# Patient Record
Sex: FEMALE | Race: WHITE | Employment: OTHER | URBAN - METROPOLITAN AREA
[De-identification: names, ages, dates, MRNs, and addresses within clinical notes are randomized per-mention and may not be internally consistent; named-entity substitution may affect disease eponyms.]

---

## 2022-09-06 ENCOUNTER — OFFICE VISIT (OUTPATIENT)
Dept: FAMILY MEDICINE CLINIC | Age: 87
End: 2022-09-06
Payer: MEDICARE

## 2022-09-06 VITALS
TEMPERATURE: 97.5 F | HEART RATE: 78 BPM | SYSTOLIC BLOOD PRESSURE: 122 MMHG | BODY MASS INDEX: 16.5 KG/M2 | HEIGHT: 65 IN | WEIGHT: 99 LBS | DIASTOLIC BLOOD PRESSURE: 62 MMHG | OXYGEN SATURATION: 98 %

## 2022-09-06 DIAGNOSIS — Z13.220 LIPID SCREENING: ICD-10-CM

## 2022-09-06 DIAGNOSIS — D64.9 ANEMIA, UNSPECIFIED TYPE: ICD-10-CM

## 2022-09-06 DIAGNOSIS — Z13.1 ENCOUNTER FOR SCREENING EXAMINATION FOR IMPAIRED GLUCOSE REGULATION AND DIABETES MELLITUS: ICD-10-CM

## 2022-09-06 DIAGNOSIS — D64.9 ANEMIA, UNSPECIFIED TYPE: Primary | ICD-10-CM

## 2022-09-06 DIAGNOSIS — Z23 NEED FOR VACCINATION AGAINST STREPTOCOCCUS PNEUMONIAE USING PNEUMOCOCCAL CONJUGATE VACCINE 13: ICD-10-CM

## 2022-09-06 LAB
ALBUMIN SERPL-MCNC: 4.6 G/DL (ref 3.5–4.6)
ALP BLD-CCNC: 58 U/L (ref 40–130)
ALT SERPL-CCNC: 10 U/L (ref 0–33)
ANION GAP SERPL CALCULATED.3IONS-SCNC: 13 MEQ/L (ref 9–15)
AST SERPL-CCNC: 25 U/L (ref 0–35)
BASOPHILS ABSOLUTE: 0 K/UL (ref 0–0.2)
BASOPHILS RELATIVE PERCENT: 0.6 %
BILIRUB SERPL-MCNC: 0.6 MG/DL (ref 0.2–0.7)
BUN BLDV-MCNC: 14 MG/DL (ref 8–23)
CALCIUM SERPL-MCNC: 9.5 MG/DL (ref 8.5–9.9)
CHLORIDE BLD-SCNC: 102 MEQ/L (ref 95–107)
CHOLESTEROL, FASTING: 255 MG/DL (ref 0–199)
CO2: 23 MEQ/L (ref 20–31)
CREAT SERPL-MCNC: 0.61 MG/DL (ref 0.5–0.9)
EOSINOPHILS ABSOLUTE: 0 K/UL (ref 0–0.7)
EOSINOPHILS RELATIVE PERCENT: 0.5 %
GFR AFRICAN AMERICAN: >60
GFR NON-AFRICAN AMERICAN: >60
GLOBULIN: 3.6 G/DL (ref 2.3–3.5)
GLUCOSE BLD-MCNC: 101 MG/DL (ref 70–99)
HCT VFR BLD CALC: 41.5 % (ref 37–47)
HDLC SERPL-MCNC: 72 MG/DL (ref 40–59)
HEMOGLOBIN: 13.7 G/DL (ref 12–16)
LDL CHOLESTEROL CALCULATED: 161 MG/DL (ref 0–129)
LYMPHOCYTES ABSOLUTE: 1.1 K/UL (ref 1–4.8)
LYMPHOCYTES RELATIVE PERCENT: 15.9 %
MCH RBC QN AUTO: 29 PG (ref 27–31.3)
MCHC RBC AUTO-ENTMCNC: 33 % (ref 33–37)
MCV RBC AUTO: 88 FL (ref 82–100)
MONOCYTES ABSOLUTE: 0.6 K/UL (ref 0.2–0.8)
MONOCYTES RELATIVE PERCENT: 8.2 %
NEUTROPHILS ABSOLUTE: 5.2 K/UL (ref 1.4–6.5)
NEUTROPHILS RELATIVE PERCENT: 74.8 %
PDW BLD-RTO: 14.1 % (ref 11.5–14.5)
PLATELET # BLD: 239 K/UL (ref 130–400)
POTASSIUM SERPL-SCNC: 4.1 MEQ/L (ref 3.4–4.9)
RBC # BLD: 4.71 M/UL (ref 4.2–5.4)
SODIUM BLD-SCNC: 138 MEQ/L (ref 135–144)
TOTAL PROTEIN: 8.2 G/DL (ref 6.3–8)
TRIGLYCERIDE, FASTING: 109 MG/DL (ref 0–150)
WBC # BLD: 6.9 K/UL (ref 4.8–10.8)

## 2022-09-06 PROCEDURE — G8427 DOCREV CUR MEDS BY ELIG CLIN: HCPCS | Performed by: PHYSICIAN ASSISTANT

## 2022-09-06 PROCEDURE — 1090F PRES/ABSN URINE INCON ASSESS: CPT | Performed by: PHYSICIAN ASSISTANT

## 2022-09-06 PROCEDURE — G8419 CALC BMI OUT NRM PARAM NOF/U: HCPCS | Performed by: PHYSICIAN ASSISTANT

## 2022-09-06 PROCEDURE — 1123F ACP DISCUSS/DSCN MKR DOCD: CPT | Performed by: PHYSICIAN ASSISTANT

## 2022-09-06 PROCEDURE — 1036F TOBACCO NON-USER: CPT | Performed by: PHYSICIAN ASSISTANT

## 2022-09-06 PROCEDURE — 99203 OFFICE O/P NEW LOW 30 MIN: CPT | Performed by: PHYSICIAN ASSISTANT

## 2022-09-06 RX ORDER — CHLORAL HYDRATE 500 MG
1000 CAPSULE ORAL DAILY
COMMUNITY

## 2022-09-06 SDOH — ECONOMIC STABILITY: FOOD INSECURITY: WITHIN THE PAST 12 MONTHS, YOU WORRIED THAT YOUR FOOD WOULD RUN OUT BEFORE YOU GOT MONEY TO BUY MORE.: NEVER TRUE

## 2022-09-06 SDOH — ECONOMIC STABILITY: FOOD INSECURITY: WITHIN THE PAST 12 MONTHS, THE FOOD YOU BOUGHT JUST DIDN'T LAST AND YOU DIDN'T HAVE MONEY TO GET MORE.: NEVER TRUE

## 2022-09-06 ASSESSMENT — PATIENT HEALTH QUESTIONNAIRE - PHQ9
SUM OF ALL RESPONSES TO PHQ QUESTIONS 1-9: 0
SUM OF ALL RESPONSES TO PHQ9 QUESTIONS 1 & 2: 0
2. FEELING DOWN, DEPRESSED OR HOPELESS: 0
1. LITTLE INTEREST OR PLEASURE IN DOING THINGS: 0
SUM OF ALL RESPONSES TO PHQ QUESTIONS 1-9: 0

## 2022-09-06 ASSESSMENT — SOCIAL DETERMINANTS OF HEALTH (SDOH): HOW HARD IS IT FOR YOU TO PAY FOR THE VERY BASICS LIKE FOOD, HOUSING, MEDICAL CARE, AND HEATING?: NOT HARD AT ALL

## 2022-09-06 NOTE — PROGRESS NOTES
Subjective  Joslyn Bernal, 81 y.o. female presents today with:  Chief Complaint   Patient presents with    New Patient    Blood Work     Wants to check hemoglobin       HPI  Patient is here accompanied by her  to establish care previously treated in [place] moved here 2 years ago  History significant for iron deficiency anemia (has had multiple transfusions to maintain levels followed by heme-onc in [place]  States she has had multiple EGDs and colonoscopies with no source of bleeding she does have a large hiatal hernia  Other history significant for hyperlipidemia and macular eye disease    Review of Systems   All other systems reviewed and are negative.       Past Medical History:   Diagnosis Date    Iron deficiency anemia      Past Surgical History:   Procedure Laterality Date    COLONOSCOPY      ESOPHAGOGASTRODUODENOSCOPY      EYE SURGERY  1999    holes in the macula    ELVIRA AND BSO (CERVIX REMOVED)      TOTAL HIP ARTHROPLASTY Bilateral 1985 jan and october     Social History     Socioeconomic History    Marital status:      Spouse name: Not on file    Number of children: Not on file    Years of education: Not on file    Highest education level: Not on file   Occupational History    Not on file   Tobacco Use    Smoking status: Never    Smokeless tobacco: Never   Substance and Sexual Activity    Alcohol use: Not on file    Drug use: Never    Sexual activity: Not on file   Other Topics Concern    Not on file   Social History Narrative    Not on file     Social Determinants of Health     Financial Resource Strain: Low Risk     Difficulty of Paying Living Expenses: Not hard at all   Food Insecurity: No Food Insecurity    Worried About Running Out of Food in the Last Year: Never true    Ran Out of Food in the Last Year: Never true   Transportation Needs: Not on file   Physical Activity: Not on file   Stress: Not on file   Social Connections: Not on file   Intimate Partner Violence: Not on file   Housing Stability: Not on file     No family history on file. Allergies   Allergen Reactions    Aspirin Hives    Levaquin [Levofloxacin]     Penicillins      Current Outpatient Medications   Medication Sig Dispense Refill    Omega-3 1000 MG CAPS Take 1,000 mg by mouth daily      B Complex Vitamins (B COMPLEX 1 PO) Take by mouth      Calcium Citrate (CITRACAL PO) Take by mouth      Bioflavonoid Products (TESSA-C/BIOFLAVONOIDS PO) Take by mouth      Red Yeast Rice Extract (RED YEAST RICE PO) Take by mouth       No current facility-administered medications for this visit. Objective    Vitals:    09/06/22 0941   BP: 122/62   Pulse: 78   Temp: 97.5 °F (36.4 °C)   TempSrc: Temporal   SpO2: 98%   Weight: 99 lb (44.9 kg)   Height: 5' 5\" (1.651 m)     Physical Exam  Constitutional:       General: She is not in acute distress. Appearance: She is underweight. She is not ill-appearing. HENT:      Head: Normocephalic and atraumatic. Eyes:      Extraocular Movements: Extraocular movements intact. Conjunctiva/sclera: Conjunctivae normal.      Pupils: Pupils are equal, round, and reactive to light. Neck:      Thyroid: No thyromegaly. Cardiovascular:      Rate and Rhythm: Normal rate and regular rhythm. Heart sounds: Normal heart sounds. No murmur heard. Pulmonary:      Effort: Pulmonary effort is normal. No respiratory distress. Breath sounds: Normal breath sounds. No wheezing, rhonchi or rales. Abdominal:      General: Bowel sounds are normal.      Palpations: Abdomen is soft. There is no mass. Tenderness: There is no abdominal tenderness. There is no guarding. Musculoskeletal:         General: Deformity present. Cervical back: Normal range of motion and neck supple. Right lower leg: No edema. Left lower leg: No edema. Lymphadenopathy:      Cervical: No cervical adenopathy. Skin:     General: Skin is warm and dry. Coloration: Skin is not jaundiced or pale. 9/6/2023).     Adina Cannon PA-C

## 2022-09-07 DIAGNOSIS — D64.9 ANEMIA, UNSPECIFIED TYPE: Primary | ICD-10-CM

## 2022-09-07 LAB
FOLATE: 12.9 NG/ML
IRON SATURATION: 15 % (ref 20–55)
IRON: 64 UG/DL (ref 37–145)
TOTAL IRON BINDING CAPACITY: 423 UG/DL (ref 250–450)
UNSATURATED IRON BINDING CAPACITY: 359 UG/DL (ref 112–347)
VITAMIN B-12: 559 PG/ML (ref 232–1245)

## 2022-09-29 ENCOUNTER — TELEPHONE (OUTPATIENT)
Dept: FAMILY MEDICINE CLINIC | Age: 87
End: 2022-09-29

## 2022-09-29 NOTE — TELEPHONE ENCOUNTER
Called patient concerning AWV and she has declined. Reports she is no longer seeing [facility] [place] [facility] for care.

## 2024-04-02 ENCOUNTER — APPOINTMENT (OUTPATIENT)
Dept: GENERAL RADIOLOGY | Age: 89
End: 2024-04-02
Payer: MEDICARE

## 2024-04-02 ENCOUNTER — HOSPITAL ENCOUNTER (EMERGENCY)
Age: 89
Discharge: HOME | End: 2024-04-02
Payer: MEDICARE

## 2024-04-02 VITALS
BODY MASS INDEX: 13.83 KG/M2 | OXYGEN SATURATION: 94 % | WEIGHT: 81 LBS | HEART RATE: 89 BPM | HEIGHT: 64 IN | SYSTOLIC BLOOD PRESSURE: 116 MMHG | DIASTOLIC BLOOD PRESSURE: 68 MMHG | RESPIRATION RATE: 18 BRPM | TEMPERATURE: 97.8 F

## 2024-04-02 DIAGNOSIS — S32.501A CLOSED NONDISPLACED FRACTURE OF RIGHT PUBIS, INITIAL ENCOUNTER (HCC): Primary | ICD-10-CM

## 2024-04-02 PROCEDURE — 99285 EMERGENCY DEPT VISIT HI MDM: CPT

## 2024-04-02 PROCEDURE — 73502 X-RAY EXAM HIP UNI 2-3 VIEWS: CPT

## 2024-04-02 RX ORDER — TRAMADOL HYDROCHLORIDE 50 MG/1
50 TABLET ORAL EVERY 8 HOURS PRN
Qty: 9 TABLET | Refills: 0 | Status: SHIPPED | OUTPATIENT
Start: 2024-04-02 | End: 2024-04-05

## 2024-04-02 ASSESSMENT — LIFESTYLE VARIABLES
HOW OFTEN DO YOU HAVE A DRINK CONTAINING ALCOHOL: NEVER
HOW MANY STANDARD DRINKS CONTAINING ALCOHOL DO YOU HAVE ON A TYPICAL DAY: PATIENT DOES NOT DRINK

## 2024-04-02 ASSESSMENT — PAIN DESCRIPTION - DESCRIPTORS: DESCRIPTORS: DISCOMFORT

## 2024-04-02 ASSESSMENT — PAIN SCALES - GENERAL: PAINLEVEL_OUTOF10: 4

## 2024-04-02 ASSESSMENT — PAIN DESCRIPTION - LOCATION: LOCATION: HIP

## 2024-04-02 ASSESSMENT — PAIN - FUNCTIONAL ASSESSMENT: PAIN_FUNCTIONAL_ASSESSMENT: 0-10

## 2024-04-02 ASSESSMENT — PAIN DESCRIPTION - ORIENTATION: ORIENTATION: RIGHT

## 2024-04-02 NOTE — ED PROVIDER NOTES
[facility] ED  EMERGENCY DEPARTMENT ENCOUNTER      Pt Name: Debbie Us  MRN: [id number]  Birthdate 1935  Date of evaluation: 4/2/2024  Provider: MANSOOR Germain  12:00 PM EDT    CHIEF COMPLAINT       Chief Complaint   Patient presents with    Fall     States she fell 1 hour ago trying to get over a puddle, pt co right hip pain, denies hitting her head - loc, - blood thinners.          HISTORY OF PRESENT ILLNESS   (Location/Symptom, Timing/Onset, Context/Setting, Quality, Duration, Modifying Factors, Severity)  Note limiting factors.   Debbie Us is a 88 y.o. female whom per chart review has a PMHx of iron deficiency anemia, hyperlipidemia, osteopenia, vitamin D deficiency, s/p bilateral total hip replacement presents to ED for evaluation following fall.  Patient reports that she was attempting to get over a puddle and patient reports that she fell onto her R hip.  Patient reports pain to her R hip since, however reports that she has been able to ambulate without difficulty.  Patient denies head injury, LOC, anticoagulation.  Patient reports that she was evaluated at [facility] ([facility]) prior to coming to the emergency department and patient reports that she did have a wound to her R forearm that was previously cleansed and had a dressing placed and reports that she has a follow-up in 2 days for wound reevaluation.  Patient is declining for wound to be reevaluated.  States that she would just like an x-ray of her R hip.  Patient verbalizes no additional complaints on arrival to the emergency department.    HPI    Nursing Notes were reviewed.    REVIEW OF SYSTEMS    (2-9 systems for level 4, 10 or more for level 5)     Review of Systems   Musculoskeletal:  Positive for arthralgias.        R hip   Skin:  Positive for wound (R forearm; patient does have a dressing in place and is requesting that dressing be left in place and wound not be reevaluated).   All other systems reviewed and are

## 2024-04-02 NOTE — DISCHARGE INSTRUCTIONS
Take medications as directed.     Wear bear as tolerated.     Follow-up with PCP, Ortho.     Return to ED if any new, or worsening symptoms.

## 2024-04-11 ENCOUNTER — OFFICE VISIT (OUTPATIENT)
Dept: ORTHOPEDIC SURGERY | Age: 89
End: 2024-04-11
Payer: MEDICARE

## 2024-04-11 ENCOUNTER — HOSPITAL ENCOUNTER (OUTPATIENT)
Dept: ORTHOPEDIC SURGERY | Age: 89
Discharge: HOME | End: 2024-04-13
Payer: MEDICARE

## 2024-04-11 VITALS — HEIGHT: 64 IN | HEART RATE: 103 BPM | OXYGEN SATURATION: 99 % | WEIGHT: 81 LBS | BODY MASS INDEX: 13.83 KG/M2

## 2024-04-11 DIAGNOSIS — M54.50 LOW BACK PAIN, UNSPECIFIED BACK PAIN LATERALITY, UNSPECIFIED CHRONICITY, UNSPECIFIED WHETHER SCIATICA PRESENT: ICD-10-CM

## 2024-04-11 DIAGNOSIS — M54.50 LOW BACK PAIN, UNSPECIFIED BACK PAIN LATERALITY, UNSPECIFIED CHRONICITY, UNSPECIFIED WHETHER SCIATICA PRESENT: Primary | ICD-10-CM

## 2024-04-11 PROCEDURE — 1036F TOBACCO NON-USER: CPT | Performed by: ORTHOPAEDIC SURGERY

## 2024-04-11 PROCEDURE — 99204 OFFICE O/P NEW MOD 45 MIN: CPT | Performed by: ORTHOPAEDIC SURGERY

## 2024-04-11 PROCEDURE — 1123F ACP DISCUSS/DSCN MKR DOCD: CPT | Performed by: ORTHOPAEDIC SURGERY

## 2024-04-11 PROCEDURE — 72110 X-RAY EXAM L-2 SPINE 4/>VWS: CPT | Performed by: ORTHOPAEDIC SURGERY

## 2024-04-11 PROCEDURE — 72110 X-RAY EXAM L-2 SPINE 4/>VWS: CPT

## 2024-04-11 PROCEDURE — 1090F PRES/ABSN URINE INCON ASSESS: CPT | Performed by: ORTHOPAEDIC SURGERY

## 2024-04-11 PROCEDURE — G8419 CALC BMI OUT NRM PARAM NOF/U: HCPCS | Performed by: ORTHOPAEDIC SURGERY

## 2024-04-11 PROCEDURE — G8427 DOCREV CUR MEDS BY ELIG CLIN: HCPCS | Performed by: ORTHOPAEDIC SURGERY

## 2024-04-11 RX ORDER — TRAMADOL HYDROCHLORIDE 50 MG/1
50 TABLET ORAL EVERY 4 HOURS PRN
Qty: 30 TABLET | Refills: 0 | Status: SHIPPED | OUTPATIENT
Start: 2024-04-11 | End: 2024-04-18

## 2024-04-11 RX ORDER — TRAMADOL HYDROCHLORIDE 50 MG/1
50 TABLET ORAL EVERY 4 HOURS PRN
Qty: 30 TABLET | Refills: 0 | Status: CANCELLED | OUTPATIENT
Start: 2024-04-11 | End: 2024-04-18

## 2024-04-11 NOTE — PROGRESS NOTES
ESOPHAGOGASTRODUODENOSCOPY      EYE SURGERY  1999    holes in the macula    ELVIRA AND BSO (CERVIX REMOVED)      TOTAL HIP ARTHROPLASTY Bilateral 1985    ana and october     --------------------------------------------------------------------------------------------------------------    Tobacco Use      Smoking status: Never      Smokeless tobacco: Never     reports no history of drug use.  --------------------------------------------------------------------------------------------------------------  Allergies   Allergen Reactions    Aspirin Hives    Levaquin [Levofloxacin]     Penicillins      --------------------------------------------------------------------------------------------------------------    Current Outpatient Medications:     Omega-3 1000 MG CAPS, Take 1 capsule by mouth daily, Disp: , Rfl:     B Complex Vitamins (B COMPLEX 1 PO), Take by mouth, Disp: , Rfl:     Calcium Citrate (CITRACAL PO), Take by mouth, Disp: , Rfl:     Bioflavonoid Products (TESSA-C/BIOFLAVONOIDS PO), Take by mouth, Disp: , Rfl:     Red Yeast Rice Extract (RED YEAST RICE PO), Take by mouth, Disp: , Rfl:   --------------------------------------------------------------------------------------------------------------    Mark Camilo DO  Orthopedic and Spine Surgeon  [facility]  [phone]

## 2024-04-13 ENCOUNTER — DOCUMENTATION (OUTPATIENT)
Dept: HOME HEALTH SERVICES | Facility: HOME HEALTH | Age: 89
End: 2024-04-13

## 2024-04-13 ENCOUNTER — OFFICE VISIT (OUTPATIENT)
Dept: PRIMARY CARE | Facility: CLINIC | Age: 89
End: 2024-04-13
Payer: MEDICARE

## 2024-04-13 ENCOUNTER — HOME HEALTH ADMISSION (OUTPATIENT)
Dept: HOME HEALTH SERVICES | Facility: HOME HEALTH | Age: 89
End: 2024-04-13
Payer: MEDICARE

## 2024-04-13 VITALS
BODY MASS INDEX: 14 KG/M2 | RESPIRATION RATE: 16 BRPM | SYSTOLIC BLOOD PRESSURE: 111 MMHG | DIASTOLIC BLOOD PRESSURE: 67 MMHG | WEIGHT: 82 LBS | TEMPERATURE: 97.5 F | OXYGEN SATURATION: 97 % | HEART RATE: 85 BPM | HEIGHT: 64 IN

## 2024-04-13 DIAGNOSIS — F03.B0 MODERATE DEMENTIA WITHOUT BEHAVIORAL DISTURBANCE, PSYCHOTIC DISTURBANCE, MOOD DISTURBANCE, OR ANXIETY, UNSPECIFIED DEMENTIA TYPE (MULTI): ICD-10-CM

## 2024-04-13 DIAGNOSIS — M81.0 AGE RELATED OSTEOPOROSIS, UNSPECIFIED PATHOLOGICAL FRACTURE PRESENCE: ICD-10-CM

## 2024-04-13 DIAGNOSIS — Z00.00 ROUTINE GENERAL MEDICAL EXAMINATION AT HEALTH CARE FACILITY: Primary | ICD-10-CM

## 2024-04-13 DIAGNOSIS — R53.83 FATIGUE, UNSPECIFIED TYPE: ICD-10-CM

## 2024-04-13 DIAGNOSIS — E78.2 HYPERLIPIDEMIA, MIXED: ICD-10-CM

## 2024-04-13 DIAGNOSIS — Z78.9 DECREASED ACTIVITIES OF DAILY LIVING (ADL): ICD-10-CM

## 2024-04-13 DIAGNOSIS — R26.9 ABNORMALITY OF GAIT: ICD-10-CM

## 2024-04-13 DIAGNOSIS — E55.9 VITAMIN D DEFICIENCY: ICD-10-CM

## 2024-04-13 DIAGNOSIS — Z78.0 POSTMENOPAUSAL: ICD-10-CM
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
## 2024-04-13 DIAGNOSIS — Z71.89 ADVANCED DIRECTIVES, COUNSELING/DISCUSSION: ICD-10-CM

## 2024-04-13 PROBLEM — M85.80 OSTEOPENIA, SENILE: Status: ACTIVE | Noted: 2021-01-19

## 2024-04-13 PROCEDURE — 1036F TOBACCO NON-USER: CPT | Performed by: FAMILY MEDICINE

## 2024-04-13 PROCEDURE — 1123F ACP DISCUSS/DSCN MKR DOCD: CPT | Performed by: FAMILY MEDICINE

## 2024-04-13 PROCEDURE — 99214 OFFICE O/P EST MOD 30 MIN: CPT | Performed by: FAMILY MEDICINE

## 2024-04-13 PROCEDURE — 1160F RVW MEDS BY RX/DR IN RCRD: CPT | Performed by: FAMILY MEDICINE

## 2024-04-13 PROCEDURE — G0439 PPPS, SUBSEQ VISIT: HCPCS | Performed by: FAMILY MEDICINE

## 2024-04-13 PROCEDURE — 99497 ADVNCD CARE PLAN 30 MIN: CPT | Performed by: FAMILY MEDICINE

## 2024-04-13 PROCEDURE — 1159F MED LIST DOCD IN RCRD: CPT | Performed by: FAMILY MEDICINE

## 2024-04-13 PROCEDURE — 1158F ADVNC CARE PLAN TLK DOCD: CPT | Performed by: FAMILY MEDICINE

## 2024-04-13 PROCEDURE — 1170F FXNL STATUS ASSESSED: CPT | Performed by: FAMILY MEDICINE

## 2024-04-13 RX ORDER — TRAMADOL HYDROCHLORIDE 50 MG/1
50 TABLET ORAL
COMMUNITY
Start: 2024-04-11 | End: 2024-04-13 | Stop reason: ALTCHOICE

## 2024-04-13 ASSESSMENT — ENCOUNTER SYMPTOMS
NECK PAIN: 0
FATIGUE: 1
DEPRESSION: 0
VOMITING: 0
SORE THROAT: 0
VOICE CHANGE: 0
PALPITATIONS: 0
FREQUENCY: 0
BLOOD IN STOOL: 0
JOINT SWELLING: 0
LIGHT-HEADEDNESS: 0
ACTIVITY CHANGE: 0
EYE REDNESS: 0
WHEEZING: 0
POLYDIPSIA: 0
ARTHRALGIAS: 0
SPEECH DIFFICULTY: 0
NAUSEA: 0
ADENOPATHY: 0
DIARRHEA: 0
CONSTIPATION: 0
PHOTOPHOBIA: 0
LOSS OF SENSATION IN FEET: 0
AGITATION: 0
OCCASIONAL FEELINGS OF UNSTEADINESS: 0
HEADACHES: 0
DIAPHORESIS: 0
EYE ITCHING: 0
WOUND: 0
TROUBLE SWALLOWING: 0
DYSPHORIC MOOD: 0
DYSURIA: 0
SINUS PRESSURE: 0
CHEST TIGHTNESS: 0
CONFUSION: 0
APPETITE CHANGE: 0
ABDOMINAL DISTENTION: 0
NERVOUS/ANXIOUS: 0
CHILLS: 0
DIZZINESS: 0
TREMORS: 0
SLEEP DISTURBANCE: 0
SHORTNESS OF BREATH: 0
NECK STIFFNESS: 0
NUMBNESS: 0
BACK PAIN: 0
MYALGIAS: 0
ABDOMINAL PAIN: 0
BRUISES/BLEEDS EASILY: 0
HEMATURIA: 0
HALLUCINATIONS: 0
FEVER: 0
EYE DISCHARGE: 0
CHOKING: 0
EYE PAIN: 0
FACIAL ASYMMETRY: 0
SEIZURES: 0
COUGH: 0
FLANK PAIN: 0
UNEXPECTED WEIGHT CHANGE: 0
WEAKNESS: 1
RHINORRHEA: 0

## 2024-04-13 ASSESSMENT — PATIENT HEALTH QUESTIONNAIRE - PHQ9
6. FEELING BAD ABOUT YOURSELF - OR THAT YOU ARE A FAILURE OR HAVE LET YOURSELF OR YOUR FAMILY DOWN: NOT AT ALL
SUM OF ALL RESPONSES TO PHQ9 QUESTIONS 1 AND 2: 0
4. FEELING TIRED OR HAVING LITTLE ENERGY: NEARLY EVERY DAY
9. THOUGHTS THAT YOU WOULD BE BETTER OFF DEAD, OR OF HURTING YOURSELF: NOT AT ALL
3. TROUBLE FALLING OR STAYING ASLEEP OR SLEEPING TOO MUCH: NOT AT ALL
1. LITTLE INTEREST OR PLEASURE IN DOING THINGS: NOT AT ALL
10. IF YOU CHECKED OFF ANY PROBLEMS, HOW DIFFICULT HAVE THESE PROBLEMS MADE IT FOR YOU TO DO YOUR WORK, TAKE CARE OF THINGS AT HOME, OR GET ALONG WITH OTHER PEOPLE: SOMEWHAT DIFFICULT
2. FEELING DOWN, DEPRESSED OR HOPELESS: NOT AT ALL
2. FEELING DOWN, DEPRESSED OR HOPELESS: NEARLY EVERY DAY
7. TROUBLE CONCENTRATING ON THINGS, SUCH AS READING THE NEWSPAPER OR WATCHING TELEVISION: NOT AT ALL
8. MOVING OR SPEAKING SO SLOWLY THAT OTHER PEOPLE COULD HAVE NOTICED. OR THE OPPOSITE, BEING SO FIGETY OR RESTLESS THAT YOU HAVE BEEN MOVING AROUND A LOT MORE THAN USUAL: SEVERAL DAYS
SUM OF ALL RESPONSES TO PHQ9 QUESTIONS 1 AND 2: 3
1. LITTLE INTEREST OR PLEASURE IN DOING THINGS: NOT AT ALL

## 2024-04-13 ASSESSMENT — ACTIVITIES OF DAILY LIVING (ADL)
DOING_HOUSEWORK: TOTAL CARE
DRESSING: NEEDS ASSISTANCE
TAKING_MEDICATION: TOTAL CARE
BATHING: NEEDS ASSISTANCE
GROCERY_SHOPPING: TOTAL CARE
MANAGING_FINANCES: TOTAL CARE

## 2024-04-13 NOTE — PROGRESS NOTES
Subjective   Patient ID: Esther Ceja is a 88 y.o. female who presents for Face-to-Face (For Home Health Aid and PT), ER Follow-up ([facility] ER 4/2/2024/DX: Fall), and Medicare Annual Wellness Visit Subsequent.    Comes into the office today for follow-up after falling.  Was seen in the emergency department x-rays were done pelvis and lumbar spine.  Fractures of uncertain age noted in lumbar spine and pelvis.  Patient not complaining of any pain at the sites so currently thought to be older fractures.   brings her in because she has been having increasing difficulty with ambulating increasing difficulty with bathing and maintaining activities of daily living.    Memory Loss    Patient reports onset of memory loss was more than 1 year ago. Onset quality is gradual.     Symptoms associated with memory loss include changes in short-term memory, changes in long-term memory and repetitive questions.     Patient does not have the following behavorial problems associated with memory loss: paranoia, suspiciousness, hallucinations, delusions or agitation.    The patient is not taking medications.     Patient lives with spouse or partner. Patient lives in a/an assisted living.  Hyperlipidemia  This is a chronic problem. The current episode started more than 1 year ago. Pertinent negatives include no chest pain, myalgias or shortness of breath. She is currently on no antihyperlipidemic treatment. Compliance problems include psychosocial issues.  Risk factors for coronary artery disease include dyslipidemia and post-menopausal.   Fatigue  This is a chronic problem. The current episode started more than 1 year ago. The problem occurs constantly. The problem has been gradually worsening. Associated symptoms include fatigue and weakness. Pertinent negatives include no abdominal pain, arthralgias, chest pain, chills, congestion, coughing, diaphoresis, fever, headaches, joint swelling, myalgias, nausea, neck pain, numbness, rash,  "sore throat or vomiting. Nothing aggravates the symptoms. She has tried rest for the symptoms. The treatment provided no relief.        Review of Systems   Constitutional:  Positive for fatigue. Negative for activity change, appetite change, chills, diaphoresis, fever and unexpected weight change.   HENT:  Negative for congestion, ear pain, hearing loss, nosebleeds, postnasal drip, rhinorrhea, sinus pressure, sneezing, sore throat, tinnitus, trouble swallowing and voice change.    Eyes:  Negative for photophobia, pain, discharge, redness, itching and visual disturbance.   Respiratory:  Negative for cough, choking, chest tightness, shortness of breath and wheezing.    Cardiovascular:  Negative for chest pain, palpitations and leg swelling.   Gastrointestinal:  Negative for abdominal distention, abdominal pain, blood in stool, constipation, diarrhea, nausea and vomiting.   Endocrine: Negative for cold intolerance, heat intolerance, polydipsia and polyuria.   Genitourinary:  Negative for dysuria, flank pain, frequency, hematuria and urgency.   Musculoskeletal:  Negative for arthralgias, back pain, joint swelling, myalgias, neck pain and neck stiffness.   Skin:  Negative for rash and wound.   Allergic/Immunologic: Negative for immunocompromised state.   Neurological:  Positive for weakness. Negative for dizziness, tremors, seizures, syncope, facial asymmetry, speech difficulty, light-headedness, numbness and headaches.   Hematological:  Negative for adenopathy. Does not bruise/bleed easily.   Psychiatric/Behavioral:  Negative for agitation, behavioral problems, confusion, dysphoric mood, hallucinations, self-injury, sleep disturbance and suicidal ideas. The patient is not nervous/anxious.        Objective   /67 (BP Location: Left arm, Patient Position: Sitting, BP Cuff Size: Adult)   Pulse 85   Temp 36.4 °C (97.5 °F) (Temporal)   Resp 16   Ht 1.613 m (5' 3.5\")   Wt (!) 37.2 kg (82 lb)   SpO2 97%   BMI 14.30 " kg/m²     Physical Exam  Constitutional:       General: She is not in acute distress.     Appearance: She is not ill-appearing or diaphoretic.   HENT:      Head: Normocephalic and atraumatic.      Right Ear: External ear normal.      Left Ear: External ear normal.      Nose: Nose normal. No rhinorrhea.   Eyes:      General: Lids are normal. No scleral icterus.        Right eye: No discharge.         Left eye: No discharge.      Conjunctiva/sclera: Conjunctivae normal.   Cardiovascular:      Rate and Rhythm: Normal rate and regular rhythm.      Pulses: Normal pulses.      Heart sounds: No murmur heard.  Pulmonary:      Effort: Pulmonary effort is normal. No respiratory distress.      Breath sounds: No decreased breath sounds, wheezing, rhonchi or rales.   Abdominal:      General: Bowel sounds are normal. There is no distension.      Palpations: Abdomen is soft. There is no mass.      Tenderness: There is no abdominal tenderness. There is no guarding or rebound.   Musculoskeletal:         General: No swelling, tenderness or deformity.      Cervical back: No rigidity or tenderness.      Right lower leg: No edema.      Left lower leg: No edema.   Lymphadenopathy:      Cervical: No cervical adenopathy.      Upper Body:      Right upper body: No supraclavicular adenopathy.      Left upper body: No supraclavicular adenopathy.   Skin:     General: Skin is warm and dry.      Coloration: Skin is not jaundiced or pale.      Findings: No erythema, lesion or rash.   Neurological:      General: No focal deficit present.      Mental Status: She is alert and oriented to person, place, and time.      Sensory: No sensory deficit.      Motor: No weakness or tremor.      Coordination: Coordination normal.      Gait: Gait normal.      Comments: Slums 9/30   Psychiatric:         Mood and Affect: Mood normal. Affect is not inappropriate.         Behavior: Behavior normal.         Assessment/Plan   Diagnoses and all orders for this  visit:  Routine general medical examination at health care facility  Postmenopausal  -     XR DEXA bone density; Future  Advanced directives, counseling/discussion  -     Full code  Fall, subsequent encounter  -     Referral to Home Care; Future  Moderate dementia without behavioral disturbance, psychotic disturbance, mood disturbance, or anxiety, unspecified dementia type (Multi)  -     Referral to Home Care; Future  -     Comprehensive Metabolic Panel; Future  -     CBC and Auto Differential; Future  -     Lipid Panel; Future  -     Magnesium; Future  -     TSH with reflex to Free T4 if abnormal; Future  -     Vitamin B12; Future  -     Folate; Future  -     Vitamin D 25-Hydroxy,Total (for eval of Vitamin D levels); Future  -     Vitamin B6; Future  -     Syphilis Screen with Reflex; Future  -     Follow Up In [facility] - PCP - Established; Future  Decreased activities of daily living (ADL)  -     Referral to Home Care; Future  Abnormality of gait  -     Referral to Home Care; Future  Age related osteoporosis, unspecified pathological fracture presence  -     Comprehensive Metabolic Panel; Future  -     Magnesium; Future  -     Vitamin D 25-Hydroxy,Total (for eval of Vitamin D levels); Future  Fatigue, unspecified type  -     TSH with reflex to Free T4 if abnormal; Future  -     Vitamin B12; Future  -     Folate; Future  -     Vitamin D 25-Hydroxy,Total (for eval of Vitamin D levels); Future  -     Vitamin B6; Future  -     Follow Up In [facility] - PCP - Established; Future  Hyperlipidemia, mixed  -     Comprehensive Metabolic Panel; Future  -     Lipid Panel; Future  -     TSH with reflex to Free T4 if abnormal; Future  -     Follow Up In [facility] - PCP - Established; Future  Vitamin D deficiency  -     Vitamin D 25-Hydroxy,Total (for eval of Vitamin D levels); Future  -     Follow Up In [facility] - PCP - Established; Future    Advance care planning was discussed  including living will, power of  for healthcare and living will.  Advance care planning packet will be provided to patient at discharge.  Patient was encouraged to bring in any advanced care planning paperwork to file on the chart at their own convenience.  (~16min spent discussing above)

## 2024-04-13 NOTE — HH CARE COORDINATION
Home Care received a Referral for Nursing, Physical Therapy, Occupational Therapy, and Home Health Aide. We have processed the referral for a Start of Care on 04/15/2024.     If you have any questions or concerns, please feel free to contact us at [phone]. Follow the prompts, enter your five digit zip code, and you will be directed to your care team on [facility] 1.

## 2024-04-16 ENCOUNTER — TRANSCRIBE ORDERS (OUTPATIENT)
Dept: ADMINISTRATIVE | Age: 89
End: 2024-04-16

## 2024-04-16 DIAGNOSIS — Z78.0 POST-MENOPAUSAL: Primary | ICD-10-CM

## 2024-04-17 ENCOUNTER — HOME CARE VISIT (OUTPATIENT)
Dept: HOME HEALTH SERVICES | Facility: HOME HEALTH | Age: 89
End: 2024-04-17
Payer: MEDICARE

## 2024-04-17 VITALS
TEMPERATURE: 97.8 F | OXYGEN SATURATION: 97 % | RESPIRATION RATE: 18 BRPM | DIASTOLIC BLOOD PRESSURE: 60 MMHG | SYSTOLIC BLOOD PRESSURE: 102 MMHG | HEART RATE: 80 BPM

## 2024-04-17 PROCEDURE — 0023 HH SOC

## 2024-04-17 PROCEDURE — 169592 NO-PAY CLAIM PROCEDURE

## 2024-04-17 PROCEDURE — 1090000002 HH PPS REVENUE DEBIT

## 2024-04-17 PROCEDURE — 1090000001 HH PPS REVENUE CREDIT

## 2024-04-17 PROCEDURE — G0299 HHS/HOSPICE OF RN EA 15 MIN: HCPCS | Mod: HHH

## 2024-04-18 ENCOUNTER — HOME CARE VISIT (OUTPATIENT)
Dept: HOME HEALTH SERVICES | Facility: HOME HEALTH | Age: 89
End: 2024-04-18
Payer: MEDICARE

## 2024-04-18 PROCEDURE — G0152 HHCP-SERV OF OT,EA 15 MIN: HCPCS | Mod: HHH

## 2024-04-18 PROCEDURE — 1090000002 HH PPS REVENUE DEBIT

## 2024-04-18 PROCEDURE — 1090000001 HH PPS REVENUE CREDIT

## 2024-04-18 ASSESSMENT — ENCOUNTER SYMPTOMS
LOWEST PAIN SEVERITY IN PAST 24 HOURS: 3/10
CONTUSION: 1
SUBJECTIVE PAIN PROGRESSION: GRADUALLY IMPROVING
PAIN LOCATION - PAIN QUALITY: STABBING
DESCRIPTION OF MEMORY LOSS: LONG TERM
PAIN LOCATION - PAIN SEVERITY: 5/10
HIGHEST PAIN SEVERITY IN PAST 24 HOURS: 8/10
DENIES PAIN: 1
PAIN LOCATION - PAIN FREQUENCY: WITH ACTIVITY
PAIN: 1
PAIN LOCATION - RELIEVING FACTORS: LYING DOWN
PERSON REPORTING PAIN: PATIENT
APPETITE LEVEL: FAIR
FORGETFULNESS: 1
MUSCLE WEAKNESS: 1
DESCRIPTION OF MEMORY LOSS: SHORT TERM
PAIN LOCATION - EXACERBATING FACTORS: GETTING OUT OF BED
PAIN LOCATION: COCCYX
CHANGE IN APPETITE: UNCHANGED

## 2024-04-18 ASSESSMENT — ACTIVITIES OF DAILY LIVING (ADL)
PHYSICAL TRANSFERS ASSESSED: 1
DRESSING_UB_CURRENT_FUNCTION: INDEPENDENT
AMBULATION ASSISTANCE: 1
PHYSICAL_TRANSFERS_DEVICES: WW
CURRENT_FUNCTION: CONTACT GUARD ASSIST
OASIS_M1830: 03
AMBULATION ASSISTANCE: CONTACT GUARD ASSIST
DRESSING_LB_CURRENT_FUNCTION: MINIMUM ASSIST
TOILETING: 1
ENTERING_EXITING_HOME: NEEDS ASSISTANCE
TOILETING: STAND BY ASSIST
TOILETING EQUIPMENT USED: WW

## 2024-04-19 ENCOUNTER — OFFICE VISIT (OUTPATIENT)
Dept: ORTHOPEDIC SURGERY | Age: 89
End: 2024-04-19
Payer: MEDICARE

## 2024-04-19 VITALS
HEIGHT: 64 IN | OXYGEN SATURATION: 95 % | TEMPERATURE: 97.3 F | HEART RATE: 65 BPM | WEIGHT: 81 LBS | BODY MASS INDEX: 13.83 KG/M2

## 2024-04-19 DIAGNOSIS — M54.50 LOW BACK PAIN, UNSPECIFIED BACK PAIN LATERALITY, UNSPECIFIED CHRONICITY, UNSPECIFIED WHETHER SCIATICA PRESENT: Primary | ICD-10-CM

## 2024-04-19 PROCEDURE — G8427 DOCREV CUR MEDS BY ELIG CLIN: HCPCS | Performed by: ORTHOPAEDIC SURGERY

## 2024-04-19 PROCEDURE — G8419 CALC BMI OUT NRM PARAM NOF/U: HCPCS | Performed by: ORTHOPAEDIC SURGERY

## 2024-04-19 PROCEDURE — 99213 OFFICE O/P EST LOW 20 MIN: CPT | Performed by: ORTHOPAEDIC SURGERY

## 2024-04-19 PROCEDURE — 1090F PRES/ABSN URINE INCON ASSESS: CPT | Performed by: ORTHOPAEDIC SURGERY

## 2024-04-19 PROCEDURE — 1090000001 HH PPS REVENUE CREDIT

## 2024-04-19 PROCEDURE — 1036F TOBACCO NON-USER: CPT | Performed by: ORTHOPAEDIC SURGERY

## 2024-04-19 PROCEDURE — 1123F ACP DISCUSS/DSCN MKR DOCD: CPT | Performed by: ORTHOPAEDIC SURGERY

## 2024-04-19 PROCEDURE — 1090000002 HH PPS REVENUE DEBIT

## 2024-04-19 NOTE — PROGRESS NOTES
use.  --------------------------------------------------------------------------------------------------------------  Allergies   Allergen Reactions    Aspirin Hives    Levaquin [Levofloxacin]     Penicillins      --------------------------------------------------------------------------------------------------------------    Current Outpatient Medications:     Omega-3 1000 MG CAPS, Take 1 capsule by mouth daily, Disp: , Rfl:     B Complex Vitamins (B COMPLEX 1 PO), Take by mouth, Disp: , Rfl:     Calcium Citrate (CITRACAL PO), Take by mouth, Disp: , Rfl:     Bioflavonoid Products (TESSA-C/BIOFLAVONOIDS PO), Take by mouth, Disp: , Rfl:     Red Yeast Rice Extract (RED YEAST RICE PO), Take by mouth, Disp: , Rfl:   --------------------------------------------------------------------------------------------------------------    Mark Camilo DO  Orthopedic and Spine Surgeon  [facility]  [phone]

## 2024-04-20 PROCEDURE — 1090000001 HH PPS REVENUE CREDIT

## 2024-04-20 PROCEDURE — 1090000002 HH PPS REVENUE DEBIT

## 2024-04-21 PROCEDURE — 1090000001 HH PPS REVENUE CREDIT

## 2024-04-21 PROCEDURE — 1090000002 HH PPS REVENUE DEBIT

## 2024-04-22 ENCOUNTER — HOME CARE VISIT (OUTPATIENT)
Dept: HOME HEALTH SERVICES | Facility: HOME HEALTH | Age: 89
End: 2024-04-22
Payer: MEDICARE

## 2024-04-22 VITALS — TEMPERATURE: 97.7 F

## 2024-04-22 PROCEDURE — 1090000001 HH PPS REVENUE CREDIT

## 2024-04-22 PROCEDURE — 1090000002 HH PPS REVENUE DEBIT

## 2024-04-22 PROCEDURE — G0151 HHCP-SERV OF PT,EA 15 MIN: HCPCS | Mod: HHH

## 2024-04-22 SDOH — HEALTH STABILITY: PHYSICAL HEALTH: PHYSICAL EXERCISE: SEATED

## 2024-04-22 SDOH — HEALTH STABILITY: PHYSICAL HEALTH: EXERCISE ACTIVITY: KNEE FLEIXON

## 2024-04-22 SDOH — HEALTH STABILITY: PHYSICAL HEALTH: EXERCISE TYPE: INSTRUCTED AND DEMONSTRATED SUP INDEP SEATED THER EX PROGRAM, NEW HANDOUT PROVIDED

## 2024-04-22 SDOH — HEALTH STABILITY: PHYSICAL HEALTH: EXERCISE ACTIVITY: SIT TO STAND

## 2024-04-22 SDOH — HEALTH STABILITY: PHYSICAL HEALTH: EXERCISE ACTIVITY: HIP ABD/ADDUCTION

## 2024-04-22 SDOH — HEALTH STABILITY: PHYSICAL HEALTH: EXERCISE ACTIVITY: ANKLE PUMPS

## 2024-04-22 SDOH — HEALTH STABILITY: PHYSICAL HEALTH: EXERCISE ACTIVITY: HIP FLEXION

## 2024-04-22 SDOH — HEALTH STABILITY: PHYSICAL HEALTH: EXERCISE ACTIVITY: KNEE EXTENSION

## 2024-04-22 ASSESSMENT — ENCOUNTER SYMPTOMS
PAIN: 1
PERSON REPORTING PAIN: PATIENT
SUBJECTIVE PAIN PROGRESSION: UNCHANGED
PAIN LOCATION - PAIN FREQUENCY: FREQUENT
HIGHEST PAIN SEVERITY IN PAST 24 HOURS: 4/10
LOWEST PAIN SEVERITY IN PAST 24 HOURS: 0/10
PAIN SEVERITY GOAL: 0/10
PAIN LOCATION - PAIN SEVERITY: 2/10
PAIN LOCATION: BACK
OCCASIONAL FEELINGS OF UNSTEADINESS: 1
PAIN LOCATION - PAIN QUALITY: ACHING

## 2024-04-22 ASSESSMENT — ACTIVITIES OF DAILY LIVING (ADL): AMBULATION ASSISTANCE ON FLAT SURFACES: 1

## 2024-04-23 ENCOUNTER — HOME CARE VISIT (OUTPATIENT)
Dept: HOME HEALTH SERVICES | Facility: HOME HEALTH | Age: 89
End: 2024-04-23
Payer: MEDICARE

## 2024-04-23 PROCEDURE — 1090000002 HH PPS REVENUE DEBIT

## 2024-04-23 PROCEDURE — 1090000001 HH PPS REVENUE CREDIT

## 2024-04-23 PROCEDURE — G0152 HHCP-SERV OF OT,EA 15 MIN: HCPCS | Mod: HHH

## 2024-04-23 ASSESSMENT — ENCOUNTER SYMPTOMS
PERSON REPORTING PAIN: PATIENT
DENIES PAIN: 1

## 2024-04-24 ENCOUNTER — HOME CARE VISIT (OUTPATIENT)
Dept: HOME HEALTH SERVICES | Facility: HOME HEALTH | Age: 89
End: 2024-04-24
Payer: MEDICARE

## 2024-04-24 ENCOUNTER — LAB (OUTPATIENT)
Dept: LAB | Facility: LAB | Age: 89
End: 2024-04-24
Payer: MEDICARE

## 2024-04-24 VITALS
RESPIRATION RATE: 18 BRPM | OXYGEN SATURATION: 98 % | SYSTOLIC BLOOD PRESSURE: 100 MMHG | HEART RATE: 64 BPM | DIASTOLIC BLOOD PRESSURE: 54 MMHG

## 2024-04-24 VITALS — HEART RATE: 107 BPM

## 2024-04-24 DIAGNOSIS — E55.9 VITAMIN D DEFICIENCY: ICD-10-CM

## 2024-04-24 DIAGNOSIS — R53.83 FATIGUE, UNSPECIFIED TYPE: ICD-10-CM

## 2024-04-24 DIAGNOSIS — E78.2 HYPERLIPIDEMIA, MIXED: ICD-10-CM

## 2024-04-24 DIAGNOSIS — M81.0 AGE RELATED OSTEOPOROSIS, UNSPECIFIED PATHOLOGICAL FRACTURE PRESENCE: ICD-10-CM

## 2024-04-24 DIAGNOSIS — F03.B0 MODERATE DEMENTIA WITHOUT BEHAVIORAL DISTURBANCE, PSYCHOTIC DISTURBANCE, MOOD DISTURBANCE, OR ANXIETY, UNSPECIFIED DEMENTIA TYPE (MULTI): ICD-10-CM

## 2024-04-24 LAB
25(OH)D3 SERPL-MCNC: 23 NG/ML (ref 30–100)
ALBUMIN SERPL BCP-MCNC: 3.7 G/DL (ref 3.4–5)
ALP SERPL-CCNC: 169 U/L (ref 33–136)
ALT SERPL W P-5'-P-CCNC: 9 U/L (ref 7–45)
ANION GAP SERPL CALC-SCNC: 15 MMOL/L (ref 10–20)
AST SERPL W P-5'-P-CCNC: 20 U/L (ref 9–39)
BASOPHILS # BLD AUTO: 0.05 X10*3/UL (ref 0–0.1)
BASOPHILS NFR BLD AUTO: 0.9 %
BILIRUB SERPL-MCNC: 0.7 MG/DL (ref 0–1.2)
BUN SERPL-MCNC: 19 MG/DL (ref 6–23)
CALCIUM SERPL-MCNC: 9.1 MG/DL (ref 8.6–10.3)
CHLORIDE SERPL-SCNC: 103 MMOL/L (ref 98–107)
CHOLEST SERPL-MCNC: 212 MG/DL (ref 0–199)
CHOLESTEROL/HDL RATIO: 3.9
CO2 SERPL-SCNC: 25 MMOL/L (ref 21–32)
CREAT SERPL-MCNC: 0.55 MG/DL (ref 0.5–1.05)
EGFRCR SERPLBLD CKD-EPI 2021: 89 ML/MIN/1.73M*2
EOSINOPHIL # BLD AUTO: 0.08 X10*3/UL (ref 0–0.4)
EOSINOPHIL NFR BLD AUTO: 1.5 %
ERYTHROCYTE [DISTWIDTH] IN BLOOD BY AUTOMATED COUNT: 13.5 % (ref 11.5–14.5)
FOLATE SERPL-MCNC: 9.2 NG/ML
GLUCOSE SERPL-MCNC: 65 MG/DL (ref 74–99)
HCT VFR BLD AUTO: 41.6 % (ref 36–46)
HDLC SERPL-MCNC: 53.9 MG/DL
HGB BLD-MCNC: 12.9 G/DL (ref 12–16)
IMM GRANULOCYTES # BLD AUTO: 0.01 X10*3/UL (ref 0–0.5)
IMM GRANULOCYTES NFR BLD AUTO: 0.2 % (ref 0–0.9)
LDLC SERPL CALC-MCNC: 135 MG/DL
LYMPHOCYTES # BLD AUTO: 0.95 X10*3/UL (ref 0.8–3)
LYMPHOCYTES NFR BLD AUTO: 17.8 %
MAGNESIUM SERPL-MCNC: 2.25 MG/DL (ref 1.6–2.4)
MCH RBC QN AUTO: 29.8 PG (ref 26–34)
MCHC RBC AUTO-ENTMCNC: 31 G/DL (ref 32–36)
MCV RBC AUTO: 96 FL (ref 80–100)
MONOCYTES # BLD AUTO: 0.46 X10*3/UL (ref 0.05–0.8)
MONOCYTES NFR BLD AUTO: 8.6 %
NEUTROPHILS # BLD AUTO: 3.78 X10*3/UL (ref 1.6–5.5)
NEUTROPHILS NFR BLD AUTO: 71 %
NON HDL CHOLESTEROL: 158 MG/DL (ref 0–149)
NRBC BLD-RTO: 0 /100 WBCS (ref 0–0)
PLATELET # BLD AUTO: 431 X10*3/UL (ref 150–450)
POTASSIUM SERPL-SCNC: 4.5 MMOL/L (ref 3.5–5.3)
PROT SERPL-MCNC: 7 G/DL (ref 6.4–8.2)
RBC # BLD AUTO: 4.33 X10*6/UL (ref 4–5.2)
SODIUM SERPL-SCNC: 138 MMOL/L (ref 136–145)
TREPONEMA PALLIDUM IGG+IGM AB [PRESENCE] IN SERUM OR PLASMA BY IMMUNOASSAY: NONREACTIVE
TRIGL SERPL-MCNC: 116 MG/DL (ref 0–149)
TSH SERPL-ACNC: 2.19 MIU/L (ref 0.44–3.98)
VIT B12 SERPL-MCNC: 282 PG/ML (ref 211–911)
VLDL: 23 MG/DL (ref 0–40)
WBC # BLD AUTO: 5.3 X10*3/UL (ref 4.4–11.3)

## 2024-04-24 PROCEDURE — 80061 LIPID PANEL: CPT

## 2024-04-24 PROCEDURE — 85025 COMPLETE CBC W/AUTO DIFF WBC: CPT

## 2024-04-24 PROCEDURE — 1090000002 HH PPS REVENUE DEBIT

## 2024-04-24 PROCEDURE — 1090000001 HH PPS REVENUE CREDIT

## 2024-04-24 PROCEDURE — 80053 COMPREHEN METABOLIC PANEL: CPT

## 2024-04-24 PROCEDURE — 84207 ASSAY OF VITAMIN B-6: CPT

## 2024-04-24 PROCEDURE — 84443 ASSAY THYROID STIM HORMONE: CPT

## 2024-04-24 PROCEDURE — G0299 HHS/HOSPICE OF RN EA 15 MIN: HCPCS | Mod: HHH

## 2024-04-24 PROCEDURE — 82306 VITAMIN D 25 HYDROXY: CPT

## 2024-04-24 PROCEDURE — 83735 ASSAY OF MAGNESIUM: CPT

## 2024-04-24 PROCEDURE — G0157 HHC PT ASSISTANT EA 15: HCPCS | Mod: CQ,HHH

## 2024-04-24 PROCEDURE — 82607 VITAMIN B-12: CPT

## 2024-04-24 PROCEDURE — 82746 ASSAY OF FOLIC ACID SERUM: CPT

## 2024-04-24 PROCEDURE — 86780 TREPONEMA PALLIDUM: CPT

## 2024-04-24 PROCEDURE — 36415 COLL VENOUS BLD VENIPUNCTURE: CPT

## 2024-04-24 SDOH — HEALTH STABILITY: PHYSICAL HEALTH
EXERCISE COMMENTS: STRENGTH TRAINING INCLUDING SEATED AP, LAQ, HIP FLEXION, PILLOW SQUEEZES, LIFT UPS, STANDING HEEL RAISES, MARCHES, REINFORCEMENT FOR ALIGNMENT, PACING, BREATHING OUT WITH EXERTION 15 REPS.

## 2024-04-24 ASSESSMENT — ACTIVITIES OF DAILY LIVING (ADL)
AMBULATION ASSISTANCE: 1
TOILETING: 1
CURRENT_FUNCTION: CONTACT GUARD ASSIST
AMBULATION ASSISTANCE: STAND BY ASSIST
TOILETING: MODERATE ASSIST
DRESSING_LB_CURRENT_FUNCTION: MODERATE ASSIST
BATHING_CURRENT_FUNCTION: MINIMUM ASSIST
PHYSICAL TRANSFERS ASSESSED: 1
BATHING ASSESSED: 1

## 2024-04-24 ASSESSMENT — ENCOUNTER SYMPTOMS
PAIN: 1
PERSON REPORTING PAIN: PATIENT
FORGETFULNESS: 1
APPETITE LEVEL: GOOD
CHANGE IN APPETITE: UNCHANGED
MUSCLE WEAKNESS: 1
DENIES PAIN: 1

## 2024-04-25 ENCOUNTER — HOME CARE VISIT (OUTPATIENT)
Dept: HOME HEALTH SERVICES | Facility: HOME HEALTH | Age: 89
End: 2024-04-25
Payer: MEDICARE

## 2024-04-25 PROCEDURE — 1090000001 HH PPS REVENUE CREDIT

## 2024-04-25 PROCEDURE — 1090000002 HH PPS REVENUE DEBIT

## 2024-04-25 PROCEDURE — G0180 MD CERTIFICATION HHA PATIENT: HCPCS | Performed by: FAMILY MEDICINE

## 2024-04-25 PROCEDURE — G0152 HHCP-SERV OF OT,EA 15 MIN: HCPCS | Mod: HHH

## 2024-04-25 ASSESSMENT — ACTIVITIES OF DAILY LIVING (ADL)
PREPARING MEALS: NEEDS ASSISTANCE
PHYSICAL TRANSFERS ASSESSED: 1
AMBULATION ASSISTANCE: 1
CURRENT_FUNCTION: STAND BY ASSIST
AMBULATION ASSISTANCE: STAND BY ASSIST
PHYSICAL_TRANSFERS_DEVICES: WW
DRESSING_LB_CURRENT_FUNCTION: STAND BY ASSIST

## 2024-04-25 ASSESSMENT — ENCOUNTER SYMPTOMS: DENIES PAIN: 1

## 2024-04-26 ENCOUNTER — HOME CARE VISIT (OUTPATIENT)
Dept: HOME HEALTH SERVICES | Facility: HOME HEALTH | Age: 89
End: 2024-04-26
Payer: MEDICARE

## 2024-04-26 PROCEDURE — 1090000001 HH PPS REVENUE CREDIT

## 2024-04-26 PROCEDURE — 1090000002 HH PPS REVENUE DEBIT

## 2024-04-26 PROCEDURE — G0156 HHCP-SVS OF AIDE,EA 15 MIN: HCPCS | Mod: HHH

## 2024-04-27 PROCEDURE — 1090000002 HH PPS REVENUE DEBIT

## 2024-04-27 PROCEDURE — 1090000001 HH PPS REVENUE CREDIT

## 2024-04-28 LAB — PYRIDOXAL PHOS SERPL-SCNC: 16.7 NMOL/L (ref 20–125)

## 2024-04-28 PROCEDURE — 1090000002 HH PPS REVENUE DEBIT

## 2024-04-28 PROCEDURE — 1090000001 HH PPS REVENUE CREDIT

## 2024-04-29 PROCEDURE — 1090000002 HH PPS REVENUE DEBIT

## 2024-04-29 PROCEDURE — 1090000001 HH PPS REVENUE CREDIT

## 2024-04-30 ENCOUNTER — HOME CARE VISIT (OUTPATIENT)
Dept: HOME HEALTH SERVICES | Facility: HOME HEALTH | Age: 89
End: 2024-04-30
Payer: MEDICARE

## 2024-04-30 VITALS — HEART RATE: 130 BPM

## 2024-04-30 PROCEDURE — G0152 HHCP-SERV OF OT,EA 15 MIN: HCPCS | Mod: HHH

## 2024-04-30 PROCEDURE — 1090000002 HH PPS REVENUE DEBIT

## 2024-04-30 PROCEDURE — G0157 HHC PT ASSISTANT EA 15: HCPCS | Mod: CQ,HHH

## 2024-04-30 PROCEDURE — 1090000001 HH PPS REVENUE CREDIT

## 2024-04-30 ASSESSMENT — ACTIVITIES OF DAILY LIVING (ADL)
AMBULATION ASSISTANCE: 1
AMBULATION ASSISTANCE: SUPERVISION
CURRENT_FUNCTION: SUPERVISION
PHYSICAL TRANSFERS ASSESSED: 1
PHYSICAL_TRANSFERS_DEVICES: WW

## 2024-04-30 ASSESSMENT — ENCOUNTER SYMPTOMS: DENIES PAIN: 1

## 2024-04-30 NOTE — Clinical Note
While toileting pt, OT observed a strong odor from pt's urine. She is also reporting frequency and burning during urination. OT recommending an order for a UA.

## 2024-04-30 NOTE — CASE COMMUNICATION
----- Message -----  From: Negro Delgado DO  Sent: 4/30/2024   5:10 PM EDT  To: Christiana Muniz, OT; *      Please obtain UA and urine CNS.  Once urine obtained Rx Macrobid 100 mg #20 p.o. 1 twice daily until gone and push fluids.  ----- Message -----  From: Christiana Muniz OT  Sent: 4/30/2024   2:16 PM EDT  To: Jamil Montiel, PT; Racheal Tai CNA; *    While toileting pt, OT observed a strong odor from pt's urine. She i s also reporting frequency and burning during urination. OT recommending an order for a UA.

## 2024-05-01 PROCEDURE — 1090000002 HH PPS REVENUE DEBIT

## 2024-05-01 PROCEDURE — 1090000001 HH PPS REVENUE CREDIT

## 2024-05-02 ENCOUNTER — HOME CARE VISIT (OUTPATIENT)
Dept: HOME HEALTH SERVICES | Facility: HOME HEALTH | Age: 89
End: 2024-05-02
Payer: MEDICARE

## 2024-05-02 ENCOUNTER — LAB REQUISITION (OUTPATIENT)
Dept: LAB | Facility: LAB | Age: 89
End: 2024-05-02
Payer: MEDICARE

## 2024-05-02 VITALS
TEMPERATURE: 97.9 F | RESPIRATION RATE: 16 BRPM | HEART RATE: 70 BPM | DIASTOLIC BLOOD PRESSURE: 62 MMHG | SYSTOLIC BLOOD PRESSURE: 122 MMHG

## 2024-05-02 DIAGNOSIS — R30.0 DYSURIA: ICD-10-CM

## 2024-05-02 LAB
APPEARANCE UR: ABNORMAL
BILIRUB UR STRIP.AUTO-MCNC: NEGATIVE MG/DL
CAOX CRY #/AREA UR COMP ASSIST: ABNORMAL /HPF
COLOR UR: YELLOW
GLUCOSE UR STRIP.AUTO-MCNC: NEGATIVE MG/DL
HYALINE CASTS #/AREA URNS AUTO: ABNORMAL /LPF
KETONES UR STRIP.AUTO-MCNC: NEGATIVE MG/DL
LEUKOCYTE ESTERASE UR QL STRIP.AUTO: ABNORMAL
MUCOUS THREADS #/AREA URNS AUTO: ABNORMAL /LPF
NITRITE UR QL STRIP.AUTO: NEGATIVE
PH UR STRIP.AUTO: 5 [PH]
PROT UR STRIP.AUTO-MCNC: NEGATIVE MG/DL
RBC # UR STRIP.AUTO: NEGATIVE /UL
RBC #/AREA URNS AUTO: ABNORMAL /HPF
SP GR UR STRIP.AUTO: 1.02
SQUAMOUS #/AREA URNS AUTO: ABNORMAL /HPF
UROBILINOGEN UR STRIP.AUTO-MCNC: <2 MG/DL
WBC #/AREA URNS AUTO: ABNORMAL /HPF

## 2024-05-02 PROCEDURE — 87086 URINE CULTURE/COLONY COUNT: CPT

## 2024-05-02 PROCEDURE — 1090000002 HH PPS REVENUE DEBIT

## 2024-05-02 PROCEDURE — G0299 HHS/HOSPICE OF RN EA 15 MIN: HCPCS | Mod: HHH

## 2024-05-02 PROCEDURE — 81001 URINALYSIS AUTO W/SCOPE: CPT

## 2024-05-02 PROCEDURE — 1090000001 HH PPS REVENUE CREDIT

## 2024-05-03 ENCOUNTER — HOME CARE VISIT (OUTPATIENT)
Dept: HOME HEALTH SERVICES | Facility: HOME HEALTH | Age: 89
End: 2024-05-03
Payer: MEDICARE

## 2024-05-03 LAB — BACTERIA UR CULT: NORMAL

## 2024-05-03 PROCEDURE — 1090000002 HH PPS REVENUE DEBIT

## 2024-05-03 PROCEDURE — G0156 HHCP-SVS OF AIDE,EA 15 MIN: HCPCS | Mod: HHH

## 2024-05-03 PROCEDURE — 1090000001 HH PPS REVENUE CREDIT

## 2024-05-03 ASSESSMENT — ENCOUNTER SYMPTOMS
PAIN LOCATION - PAIN SEVERITY: 4/10
PAIN LOCATION: LEFT LEG
FREQUENCY: 1
MUSCLE WEAKNESS: 1
PAIN: 1
PAIN LOCATION: RIGHT LEG
PERSON REPORTING PAIN: PATIENT
PAIN LOCATION - PAIN SEVERITY: 4/10
CHANGE IN APPETITE: UNCHANGED

## 2024-05-04 PROCEDURE — 1090000001 HH PPS REVENUE CREDIT

## 2024-05-04 PROCEDURE — 1090000002 HH PPS REVENUE DEBIT

## 2024-05-05 PROCEDURE — 1090000001 HH PPS REVENUE CREDIT

## 2024-05-05 PROCEDURE — 1090000002 HH PPS REVENUE DEBIT

## 2024-05-06 ENCOUNTER — HOME CARE VISIT (OUTPATIENT)
Dept: HOME HEALTH SERVICES | Facility: HOME HEALTH | Age: 89
End: 2024-05-06
Payer: MEDICARE

## 2024-05-06 PROCEDURE — G0156 HHCP-SVS OF AIDE,EA 15 MIN: HCPCS | Mod: HHH

## 2024-05-06 PROCEDURE — 1090000001 HH PPS REVENUE CREDIT

## 2024-05-06 PROCEDURE — 1090000002 HH PPS REVENUE DEBIT

## 2024-05-07 PROCEDURE — 1090000002 HH PPS REVENUE DEBIT

## 2024-05-07 PROCEDURE — 1090000001 HH PPS REVENUE CREDIT

## 2024-05-08 ENCOUNTER — OFFICE VISIT (OUTPATIENT)
Dept: PRIMARY CARE | Facility: CLINIC | Age: 89
End: 2024-05-08
Payer: MEDICARE

## 2024-05-08 ENCOUNTER — HOME CARE VISIT (OUTPATIENT)
Dept: HOME HEALTH SERVICES | Facility: HOME HEALTH | Age: 89
End: 2024-05-08
Payer: MEDICARE

## 2024-05-08 ENCOUNTER — TELEPHONE (OUTPATIENT)
Dept: PRIMARY CARE | Facility: CLINIC | Age: 89
End: 2024-05-08

## 2024-05-08 VITALS
RESPIRATION RATE: 20 BRPM | SYSTOLIC BLOOD PRESSURE: 108 MMHG | HEART RATE: 86 BPM | HEIGHT: 64 IN | WEIGHT: 79 LBS | TEMPERATURE: 97.5 F | DIASTOLIC BLOOD PRESSURE: 78 MMHG | OXYGEN SATURATION: 98 % | BODY MASS INDEX: 13.49 KG/M2

## 2024-05-08 DIAGNOSIS — N30.00 ACUTE CYSTITIS WITHOUT HEMATURIA: Primary | ICD-10-CM

## 2024-05-08 DIAGNOSIS — R39.9 UTI SYMPTOMS: ICD-10-CM

## 2024-05-08 PROCEDURE — 1123F ACP DISCUSS/DSCN MKR DOCD: CPT | Performed by: PHYSICIAN ASSISTANT

## 2024-05-08 PROCEDURE — 1090000001 HH PPS REVENUE CREDIT

## 2024-05-08 PROCEDURE — 1159F MED LIST DOCD IN RCRD: CPT | Performed by: PHYSICIAN ASSISTANT

## 2024-05-08 PROCEDURE — 1090000002 HH PPS REVENUE DEBIT

## 2024-05-08 PROCEDURE — 1160F RVW MEDS BY RX/DR IN RCRD: CPT | Performed by: PHYSICIAN ASSISTANT

## 2024-05-08 PROCEDURE — 99213 OFFICE O/P EST LOW 20 MIN: CPT | Performed by: PHYSICIAN ASSISTANT

## 2024-05-08 RX ORDER — NITROFURANTOIN 25; 75 MG/1; MG/1
100 CAPSULE ORAL
Qty: 20 CAPSULE | Refills: 0 | Status: SHIPPED | OUTPATIENT
Start: 2024-05-08 | End: 2024-05-18

## 2024-05-08 NOTE — TELEPHONE ENCOUNTER
Dr Delgado pt's  calling in with concerns, pt did a urine on 5/2/24 and nobody has contacted with results. I did explain we were out of office but pt is now suffering with UTI symptoms and very concerned. Pt's  can be reached at [phone] and they use [facility] [place]

## 2024-05-08 NOTE — PROGRESS NOTES
"Subjective   Patient ID: Esther Ceja is a 88 y.o. female who presents for UTI (Dr Delgado pt here today for UTI symptoms which include burning with urination, urgency to go but she can't that started last Thursday.).    HPI     UTI sxs worsening     Review of Systems   Genitourinary:  Positive for dysuria, frequency and urgency.       Objective   /78   Pulse 86   Temp 36.4 °C (97.5 °F)   Resp 20   Ht 1.613 m (5' 3.5\")   Wt (!) 35.8 kg (79 lb)   SpO2 98%   BMI 13.77 kg/m²     Physical Exam  Constitutional:       Appearance: She is underweight.   Cardiovascular:      Rate and Rhythm: Normal rate and regular rhythm.      Pulses: Normal pulses.      Heart sounds: Normal heart sounds. No murmur heard.  Pulmonary:      Effort: Pulmonary effort is normal.      Breath sounds: Normal breath sounds.   Abdominal:      General: Abdomen is flat. Bowel sounds are normal.      Tenderness: There is no abdominal tenderness. There is no right CVA tenderness or left CVA tenderness.   Neurological:      Mental Status: She is alert.   Psychiatric:         Mood and Affect: Mood and affect normal.         Cognition and Memory: Cognition is impaired. Memory is impaired. She exhibits impaired recent memory.         Assessment/Plan     Problem List Items Addressed This Visit    None  Visit Diagnoses       Acute cystitis without hematuria    -  Primary    Relevant Medications    nitrofurantoin, macrocrystal-monohydrate, (Macrobid) 100 mg capsule    UTI symptoms        Relevant Orders    Urine Culture           Recommend she give the urine sample first and then start the Macrobid   Will call with results     "

## 2024-05-09 ENCOUNTER — HOME CARE VISIT (OUTPATIENT)
Dept: HOME HEALTH SERVICES | Facility: HOME HEALTH | Age: 89
End: 2024-05-09
Payer: MEDICARE

## 2024-05-09 ENCOUNTER — LAB (OUTPATIENT)
Dept: LAB | Facility: LAB | Age: 89
End: 2024-05-09
Payer: MEDICARE

## 2024-05-09 DIAGNOSIS — R39.9 UTI SYMPTOMS: ICD-10-CM

## 2024-05-09 PROBLEM — M81.0 SENILE OSTEOPOROSIS: Status: ACTIVE | Noted: 2024-04-24
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
## 2024-05-09 PROBLEM — F03.B0: Status: ACTIVE | Noted: 2024-04-24

## 2024-05-09 PROBLEM — R53.83 FATIGUE: Status: ACTIVE | Noted: 2024-04-24

## 2024-05-09 PROBLEM — R26.9 ABNORMAL GAIT: Status: ACTIVE | Noted: 2024-05-09

## 2024-05-09 PROCEDURE — 1090000001 HH PPS REVENUE CREDIT

## 2024-05-09 PROCEDURE — 1090000002 HH PPS REVENUE DEBIT

## 2024-05-09 PROCEDURE — 87086 URINE CULTURE/COLONY COUNT: CPT

## 2024-05-09 ASSESSMENT — ENCOUNTER SYMPTOMS
DYSURIA: 1
FREQUENCY: 1

## 2024-05-10 ENCOUNTER — HOME CARE VISIT (OUTPATIENT)
Dept: HOME HEALTH SERVICES | Facility: HOME HEALTH | Age: 89
End: 2024-05-10
Payer: MEDICARE

## 2024-05-10 VITALS
HEART RATE: 68 BPM | SYSTOLIC BLOOD PRESSURE: 122 MMHG | DIASTOLIC BLOOD PRESSURE: 64 MMHG | RESPIRATION RATE: 16 BRPM | OXYGEN SATURATION: 98 % | TEMPERATURE: 97.7 F

## 2024-05-10 LAB — BACTERIA UR CULT: NORMAL

## 2024-05-10 PROCEDURE — G0299 HHS/HOSPICE OF RN EA 15 MIN: HCPCS | Mod: HHH

## 2024-05-10 PROCEDURE — G0156 HHCP-SVS OF AIDE,EA 15 MIN: HCPCS | Mod: HHH

## 2024-05-10 PROCEDURE — 1090000002 HH PPS REVENUE DEBIT

## 2024-05-10 PROCEDURE — 1090000001 HH PPS REVENUE CREDIT

## 2024-05-10 ASSESSMENT — ENCOUNTER SYMPTOMS
APPETITE LEVEL: GOOD
CHANGE IN APPETITE: UNCHANGED
DENIES PAIN: 1

## 2024-05-11 PROCEDURE — 1090000002 HH PPS REVENUE DEBIT

## 2024-05-11 PROCEDURE — 1090000001 HH PPS REVENUE CREDIT

## 2024-05-12 PROCEDURE — 1090000002 HH PPS REVENUE DEBIT

## 2024-05-12 PROCEDURE — 1090000001 HH PPS REVENUE CREDIT

## 2024-05-13 ENCOUNTER — HOME CARE VISIT (OUTPATIENT)
Dept: HOME HEALTH SERVICES | Facility: HOME HEALTH | Age: 89
End: 2024-05-13
Payer: MEDICARE

## 2024-05-13 VITALS — OXYGEN SATURATION: 97 % | HEART RATE: 70 BPM | TEMPERATURE: 99.5 F

## 2024-05-13 PROCEDURE — 1090000002 HH PPS REVENUE DEBIT

## 2024-05-13 PROCEDURE — G0157 HHC PT ASSISTANT EA 15: HCPCS | Mod: CQ,HHH

## 2024-05-13 PROCEDURE — 1090000001 HH PPS REVENUE CREDIT

## 2024-05-14 PROCEDURE — 1090000002 HH PPS REVENUE DEBIT

## 2024-05-14 PROCEDURE — 1090000001 HH PPS REVENUE CREDIT

## 2024-05-15 PROCEDURE — 1090000001 HH PPS REVENUE CREDIT

## 2024-05-15 PROCEDURE — 1090000002 HH PPS REVENUE DEBIT

## 2024-05-16 ENCOUNTER — HOME CARE VISIT (OUTPATIENT)
Dept: HOME HEALTH SERVICES | Facility: HOME HEALTH | Age: 89
End: 2024-05-16
Payer: MEDICARE

## 2024-05-16 PROCEDURE — 1090000002 HH PPS REVENUE DEBIT

## 2024-05-16 PROCEDURE — 1090000001 HH PPS REVENUE CREDIT

## 2024-05-16 PROCEDURE — G0157 HHC PT ASSISTANT EA 15: HCPCS | Mod: CQ,HHH

## 2024-05-16 PROCEDURE — 1090000003 HH PPS REVENUE ADJ

## 2024-05-17 ENCOUNTER — OFFICE VISIT (OUTPATIENT)
Dept: PRIMARY CARE | Facility: CLINIC | Age: 89
End: 2024-05-17
Payer: MEDICARE

## 2024-05-17 VITALS
RESPIRATION RATE: 16 BRPM | WEIGHT: 75.8 LBS | BODY MASS INDEX: 12.94 KG/M2 | OXYGEN SATURATION: 95 % | TEMPERATURE: 98 F | HEART RATE: 107 BPM | DIASTOLIC BLOOD PRESSURE: 60 MMHG | HEIGHT: 64 IN | SYSTOLIC BLOOD PRESSURE: 100 MMHG

## 2024-05-17 DIAGNOSIS — E55.9 VITAMIN D DEFICIENCY: ICD-10-CM

## 2024-05-17 DIAGNOSIS — R53.83 FATIGUE, UNSPECIFIED TYPE: ICD-10-CM

## 2024-05-17 DIAGNOSIS — E78.2 HYPERLIPIDEMIA, MIXED: ICD-10-CM

## 2024-05-17 DIAGNOSIS — F03.B0 MODERATE DEMENTIA WITHOUT BEHAVIORAL DISTURBANCE, PSYCHOTIC DISTURBANCE, MOOD DISTURBANCE, OR ANXIETY, UNSPECIFIED DEMENTIA TYPE (MULTI): ICD-10-CM

## 2024-05-17 DIAGNOSIS — E53.1 VITAMIN B6 DEFICIENCY (NON ANEMIC): Primary | ICD-10-CM

## 2024-05-17 PROCEDURE — 1160F RVW MEDS BY RX/DR IN RCRD: CPT | Performed by: FAMILY MEDICINE

## 2024-05-17 PROCEDURE — 1159F MED LIST DOCD IN RCRD: CPT | Performed by: FAMILY MEDICINE

## 2024-05-17 PROCEDURE — 1123F ACP DISCUSS/DSCN MKR DOCD: CPT | Performed by: FAMILY MEDICINE

## 2024-05-17 PROCEDURE — 99214 OFFICE O/P EST MOD 30 MIN: CPT | Performed by: FAMILY MEDICINE

## 2024-05-17 PROCEDURE — 1090000001 HH PPS REVENUE CREDIT

## 2024-05-17 PROCEDURE — 1036F TOBACCO NON-USER: CPT | Performed by: FAMILY MEDICINE

## 2024-05-17 PROCEDURE — 1090000002 HH PPS REVENUE DEBIT

## 2024-05-17 RX ORDER — ACETAMINOPHEN 500 MG
2000 TABLET ORAL DAILY
Qty: 90 CAPSULE | Refills: 3 | Status: SHIPPED | OUTPATIENT
Start: 2024-05-17 | End: 2025-05-17

## 2024-05-17 ASSESSMENT — ENCOUNTER SYMPTOMS
HEADACHES: 0
FREQUENCY: 0
JOINT SWELLING: 0
ABDOMINAL PAIN: 0
SLEEP DISTURBANCE: 0
BRUISES/BLEEDS EASILY: 0
ARTHRALGIAS: 0
AGITATION: 0
WHEEZING: 0
ABDOMINAL DISTENTION: 0
MYALGIAS: 0
CONSTIPATION: 0
PHOTOPHOBIA: 0
WOUND: 0
EYE ITCHING: 0
BLOOD IN STOOL: 0
DYSPHORIC MOOD: 0
CHOKING: 0
WEAKNESS: 1
BACK PAIN: 0
NAUSEA: 0
FLANK PAIN: 0
EYE REDNESS: 0
SPEECH DIFFICULTY: 0
RHINORRHEA: 0
NERVOUS/ANXIOUS: 0
TREMORS: 0
EYE DISCHARGE: 0
CHEST TIGHTNESS: 0
SEIZURES: 0
SHORTNESS OF BREATH: 0
VOICE CHANGE: 0
SORE THROAT: 0
APPETITE CHANGE: 0
FATIGUE: 1
POLYDIPSIA: 0
HALLUCINATIONS: 0
NECK STIFFNESS: 0
FACIAL ASYMMETRY: 0
DYSURIA: 0
EYE PAIN: 0
SINUS PRESSURE: 0
PALPITATIONS: 0
VOMITING: 0
TROUBLE SWALLOWING: 0
LIGHT-HEADEDNESS: 0
DIAPHORESIS: 0
DIARRHEA: 0
CONFUSION: 0
UNEXPECTED WEIGHT CHANGE: 0
COUGH: 0
ADENOPATHY: 0
NUMBNESS: 0
HEMATURIA: 0
CHILLS: 0
ACTIVITY CHANGE: 0
DIZZINESS: 0
FEVER: 0
NECK PAIN: 0

## 2024-05-17 NOTE — PROGRESS NOTES
Subjective   Patient ID: Esther Ceja is a 89 y.o. female who presents for 1 month follow up (Pt is no longer having any UTI symptoms. No concerns for today.).    Coming into the office today for follow-up on dementia, fatigue and lipids.  Also had recent symptoms of UTI that resolved with nitrofurantoin cultures were negative.    Memory Loss    Patient reports onset of memory loss was more than 1 year ago. Onset quality is gradual.     Symptoms associated with memory loss include changes in short-term memory, changes in long-term memory and repetitive questions.     Patient does not have the following behavorial problems associated with memory loss: paranoia, suspiciousness, hallucinations, delusions or agitation.    The patient is not taking medications.     Patient lives with spouse or partner. Patient lives in a/an assisted living.  Hyperlipidemia  This is a chronic problem. The current episode started more than 1 year ago. Pertinent negatives include no chest pain, myalgias or shortness of breath. She is currently on no antihyperlipidemic treatment. Compliance problems include psychosocial issues.  Risk factors for coronary artery disease include dyslipidemia and post-menopausal.   Fatigue  This is a chronic problem. The current episode started more than 1 year ago. The problem occurs constantly. The problem has been gradually worsening. Associated symptoms include fatigue and weakness. Pertinent negatives include no abdominal pain, arthralgias, chest pain, chills, congestion, coughing, diaphoresis, fever, headaches, joint swelling, myalgias, nausea, neck pain, numbness, rash, sore throat or vomiting. Nothing aggravates the symptoms. She has tried rest for the symptoms. The treatment provided no relief.        Review of Systems   Constitutional:  Positive for fatigue. Negative for activity change, appetite change, chills, diaphoresis, fever and unexpected weight change.   HENT:  Negative for congestion, ear pain,  "hearing loss, nosebleeds, postnasal drip, rhinorrhea, sinus pressure, sneezing, sore throat, tinnitus, trouble swallowing and voice change.    Eyes:  Negative for photophobia, pain, discharge, redness, itching and visual disturbance.   Respiratory:  Negative for cough, choking, chest tightness, shortness of breath and wheezing.    Cardiovascular:  Negative for chest pain, palpitations and leg swelling.   Gastrointestinal:  Negative for abdominal distention, abdominal pain, blood in stool, constipation, diarrhea, nausea and vomiting.   Endocrine: Negative for cold intolerance, heat intolerance, polydipsia and polyuria.   Genitourinary:  Negative for dysuria, flank pain, frequency, hematuria and urgency.   Musculoskeletal:  Negative for arthralgias, back pain, joint swelling, myalgias, neck pain and neck stiffness.   Skin:  Negative for rash and wound.   Allergic/Immunologic: Negative for immunocompromised state.   Neurological:  Positive for weakness. Negative for dizziness, tremors, seizures, syncope, facial asymmetry, speech difficulty, light-headedness, numbness and headaches.   Hematological:  Negative for adenopathy. Does not bruise/bleed easily.   Psychiatric/Behavioral:  Negative for agitation, behavioral problems, confusion, dysphoric mood, hallucinations, self-injury, sleep disturbance and suicidal ideas. The patient is not nervous/anxious.        Objective   /60 (BP Location: Left arm, Patient Position: Sitting, BP Cuff Size: Adult)   Pulse 107   Temp 36.7 °C (98 °F) (Temporal)   Resp 16   Ht 1.613 m (5' 3.5\")   Wt (!) 34.4 kg (75 lb 12.8 oz)   SpO2 95%   BMI 13.22 kg/m²     Physical Exam  Constitutional:       General: She is not in acute distress.     Appearance: She is not ill-appearing or diaphoretic.   HENT:      Head: Normocephalic and atraumatic.      Right Ear: External ear normal.      Left Ear: External ear normal.      Nose: Nose normal. No rhinorrhea.   Eyes:      General: Lids are " normal. No scleral icterus.        Right eye: No discharge.         Left eye: No discharge.      Conjunctiva/sclera: Conjunctivae normal.   Cardiovascular:      Rate and Rhythm: Normal rate and regular rhythm.      Pulses: Normal pulses.      Heart sounds: No murmur heard.  Pulmonary:      Effort: Pulmonary effort is normal. No respiratory distress.      Breath sounds: No decreased breath sounds, wheezing, rhonchi or rales.   Abdominal:      General: Bowel sounds are normal. There is no distension.      Palpations: Abdomen is soft. There is no mass.      Tenderness: There is no abdominal tenderness. There is no guarding or rebound.   Musculoskeletal:         General: No swelling, tenderness or deformity.      Cervical back: No rigidity or tenderness.      Right lower leg: No edema.      Left lower leg: No edema.   Lymphadenopathy:      Cervical: No cervical adenopathy.      Upper Body:      Right upper body: No supraclavicular adenopathy.      Left upper body: No supraclavicular adenopathy.   Skin:     General: Skin is warm and dry.      Coloration: Skin is not jaundiced or pale.      Findings: No erythema, lesion or rash.   Neurological:      General: No focal deficit present.      Mental Status: She is alert and oriented to person, place, and time.      Sensory: No sensory deficit.      Motor: Weakness present. No tremor.      Coordination: Coordination normal.      Gait: Gait abnormal (Shuffling).   Psychiatric:         Mood and Affect: Mood normal. Affect is not inappropriate.         Behavior: Behavior normal.         Assessment/Plan   Diagnoses and all orders for this visit:  Vitamin B6 deficiency (non anemic)  -     B complex-vitamin C-folic acid (Nephrocaps) 1 mg capsule; Take 1 capsule by mouth once daily.  -     Vitamin B6; Future  Moderate dementia without behavioral disturbance, psychotic disturbance, mood disturbance, or anxiety, unspecified dementia type (Multi)  -     Follow Up In [facility] - PCP - Established  -     CBC and Auto Differential; Future  -     Lipid Panel; Future  -     Comprehensive Metabolic Panel; Future  -     Vitamin B6; Future  Fatigue, unspecified type  -     Follow Up In [facility] - PCP - Established  -     CBC and Auto Differential; Future  -     Lipid Panel; Future  -     Comprehensive Metabolic Panel; Future  -     Magnesium; Future  -     TSH with reflex to Free T4 if abnormal; Future  -     Vitamin D 25-Hydroxy,Total (for eval of Vitamin D levels); Future  -     Vitamin B6; Future  Hyperlipidemia, mixed  -     Follow Up In [facility] - PCP - Established  -     Lipid Panel; Future  -     Comprehensive Metabolic Panel; Future  -     TSH with reflex to Free T4 if abnormal; Future  Vitamin D deficiency  -     Follow Up In [facility] - PCP - Established  -     cholecalciferol (Vitamin D3) 50 mcg (2,000 unit) capsule; Take 1 capsule (50 mcg) by mouth once daily.  -     Comprehensive Metabolic Panel; Future  -     Magnesium; Future  -     Vitamin D 25-Hydroxy,Total (for eval of Vitamin D levels); Future  As far as UTI symptoms have resolved.  Cultures were both negative but cultures were obtained after nitrofurantoin started.  If symptoms recur recommend obtain urine culture IMMEDIATELY once urine sample obtained they should call and I will put in an order for a culture and antibiotics.  Reminded patient and her  antibiotic should not be started before culture obtained as that will reduce the utility of the culture.  Follow-up in 6 months with labs

## 2024-05-18 PROCEDURE — 1090000002 HH PPS REVENUE DEBIT

## 2024-05-18 PROCEDURE — 1090000001 HH PPS REVENUE CREDIT

## 2024-05-19 PROCEDURE — 1090000002 HH PPS REVENUE DEBIT

## 2024-05-19 PROCEDURE — 1090000001 HH PPS REVENUE CREDIT

## 2024-05-20 PROCEDURE — 1090000001 HH PPS REVENUE CREDIT

## 2024-05-20 PROCEDURE — 1090000002 HH PPS REVENUE DEBIT

## 2024-05-21 ENCOUNTER — HOME CARE VISIT (OUTPATIENT)
Dept: HOME HEALTH SERVICES | Facility: HOME HEALTH | Age: 89
End: 2024-05-21
Payer: MEDICARE

## 2024-05-21 VITALS — TEMPERATURE: 98 F | HEART RATE: 68 BPM | OXYGEN SATURATION: 97 %

## 2024-05-21 PROCEDURE — 1090000003 HH PPS REVENUE ADJ

## 2024-05-21 PROCEDURE — 1090000001 HH PPS REVENUE CREDIT

## 2024-05-21 PROCEDURE — 1090000002 HH PPS REVENUE DEBIT

## 2024-05-21 PROCEDURE — G0151 HHCP-SERV OF PT,EA 15 MIN: HCPCS | Mod: HHH

## 2024-05-21 PROCEDURE — 0023 HH SOC

## 2024-05-21 SDOH — HEALTH STABILITY: PHYSICAL HEALTH: EXERCISE TYPE: INSTRUCTED AND DEMONSTRATED STANDING THER EX PROGRAM

## 2024-05-21 ASSESSMENT — ENCOUNTER SYMPTOMS
DENIES PAIN: 1
OCCASIONAL FEELINGS OF UNSTEADINESS: 1
PERSON REPORTING PAIN: PATIENT

## 2024-05-21 ASSESSMENT — ACTIVITIES OF DAILY LIVING (ADL)
OASIS_M1830: 02
AMBULATION ASSISTANCE ON FLAT SURFACES: 1
HOME_HEALTH_OASIS: 01

## 2024-05-24 ENCOUNTER — TELEPHONE (OUTPATIENT)
Dept: PRIMARY CARE | Facility: CLINIC | Age: 89
End: 2024-05-24
Payer: MEDICARE

## 2024-05-24 NOTE — TELEPHONE ENCOUNTER
Patient's  Adolfo phones the office today to inform TW that patient has not been willing to shower for over two weeks and he has had some trouble with other issues as well.     He would like to know if it is possible to have Home Health come in and assist him with these problems or possibly another route.     Please advise and contact Mr. Ceja at [phone].     Thank You.
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
## 2024-06-14 NOTE — TELEPHONE ENCOUNTER
Negro Delgado DO        [facility]  received a fax [phone] for [place] [facility] Care Orders dated 4/15/2024 for SN, PT, OT, and HHA . Patient was discharged from  Home Health   5/21/2024 as per documentation indicating goals met.  Has this patient experienced a significant change in status ? If yes  patient will require a office visit with  Negro Delgado DO  indicating this as well as new orders will be required . Once this is completed please submit a new order  for review       Thank you     SANTOS SALINAS LPN    Referral

## 2024-06-27 ENCOUNTER — HOME HEALTH ADMISSION (OUTPATIENT)
Dept: HOME HEALTH SERVICES | Facility: HOME HEALTH | Age: 89
End: 2024-06-27
Payer: MEDICARE

## 2024-06-27 ENCOUNTER — DOCUMENTATION (OUTPATIENT)
Dept: HOME HEALTH SERVICES | Facility: HOME HEALTH | Age: 89
End: 2024-06-27

## 2024-06-27 ENCOUNTER — APPOINTMENT (OUTPATIENT)
Dept: PRIMARY CARE | Facility: CLINIC | Age: 89
End: 2024-06-27
Payer: MEDICARE

## 2024-06-27 VITALS
SYSTOLIC BLOOD PRESSURE: 101 MMHG | TEMPERATURE: 97.3 F | DIASTOLIC BLOOD PRESSURE: 60 MMHG | HEIGHT: 64 IN | WEIGHT: 77.8 LBS | OXYGEN SATURATION: 95 % | HEART RATE: 85 BPM | RESPIRATION RATE: 16 BRPM | BODY MASS INDEX: 13.28 KG/M2

## 2024-06-27 DIAGNOSIS — Z71.89 ADVANCED DIRECTIVES, COUNSELING/DISCUSSION: ICD-10-CM

## 2024-06-27 DIAGNOSIS — F03.B0 MODERATE DEMENTIA WITHOUT BEHAVIORAL DISTURBANCE, PSYCHOTIC DISTURBANCE, MOOD DISTURBANCE, OR ANXIETY, UNSPECIFIED DEMENTIA TYPE (MULTI): Primary | ICD-10-CM

## 2024-06-27 DIAGNOSIS — Z78.9 DECREASED ACTIVITIES OF DAILY LIVING (ADL): ICD-10-CM

## 2024-06-27 DIAGNOSIS — R26.9 ABNORMAL GAIT: ICD-10-CM

## 2024-06-27 PROCEDURE — 1157F ADVNC CARE PLAN IN RCRD: CPT | Performed by: FAMILY MEDICINE

## 2024-06-27 PROCEDURE — 1160F RVW MEDS BY RX/DR IN RCRD: CPT | Performed by: FAMILY MEDICINE

## 2024-06-27 PROCEDURE — 1123F ACP DISCUSS/DSCN MKR DOCD: CPT | Performed by: FAMILY MEDICINE

## 2024-06-27 PROCEDURE — 1036F TOBACCO NON-USER: CPT | Performed by: FAMILY MEDICINE

## 2024-06-27 PROCEDURE — 1158F ADVNC CARE PLAN TLK DOCD: CPT | Performed by: FAMILY MEDICINE

## 2024-06-27 PROCEDURE — 99214 OFFICE O/P EST MOD 30 MIN: CPT | Performed by: FAMILY MEDICINE

## 2024-06-27 PROCEDURE — 1159F MED LIST DOCD IN RCRD: CPT | Performed by: FAMILY MEDICINE

## 2024-06-27 ASSESSMENT — ENCOUNTER SYMPTOMS
EYE PAIN: 0
FATIGUE: 0
BRUISES/BLEEDS EASILY: 0
SLEEP DISTURBANCE: 0
EYE ITCHING: 0
DIAPHORESIS: 0
TROUBLE SWALLOWING: 0
ADENOPATHY: 0
COUGH: 0
ARTHRALGIAS: 0
UNEXPECTED WEIGHT CHANGE: 0
NUMBNESS: 0
NECK PAIN: 0
DYSURIA: 0
TREMORS: 0
HEMATURIA: 0
DIARRHEA: 0
CHEST TIGHTNESS: 0
APPETITE CHANGE: 0
VOICE CHANGE: 0
CONSTIPATION: 0
FACIAL ASYMMETRY: 0
WHEEZING: 0
DYSPHORIC MOOD: 0
FEVER: 0
WOUND: 0
SEIZURES: 0
EYE DISCHARGE: 0
BLOOD IN STOOL: 0
VOMITING: 0
SINUS PRESSURE: 0
CONFUSION: 0
POLYDIPSIA: 0
PALPITATIONS: 0
NERVOUS/ANXIOUS: 0
ABDOMINAL DISTENTION: 0
FREQUENCY: 0
PHOTOPHOBIA: 0
ABDOMINAL PAIN: 0
EYE REDNESS: 0
HEADACHES: 0
LIGHT-HEADEDNESS: 0
ACTIVITY CHANGE: 0
SPEECH DIFFICULTY: 0
AGITATION: 0
MYALGIAS: 0
JOINT SWELLING: 0
RHINORRHEA: 0
CHOKING: 0
CHILLS: 0
SHORTNESS OF BREATH: 0
NAUSEA: 0
BACK PAIN: 0
DIZZINESS: 0
FLANK PAIN: 0
HALLUCINATIONS: 0
WEAKNESS: 0
SORE THROAT: 0
NECK STIFFNESS: 0

## 2024-06-27 NOTE — PROGRESS NOTES
Subjective   Patient ID: Esther Ceja is a 89 y.o. female who presents for Face-to Face (For Home Health Aids ) and Memory Loss (Family want evaluated ).    Memory Loss    Patient reports onset of memory loss was more than 1 year ago. Onset quality is gradual.     Symptoms associated with memory loss include changes in short-term memory, changes in long-term memory and repetitive questions.     Patient does not have the following behavorial problems associated with memory loss: paranoia, suspiciousness, hallucinations, delusions or agitation.    The patient is not taking medications.     Patient lives with spouse or partner. Patient lives in a/an assisted living.       Review of Systems   Constitutional:  Negative for activity change, appetite change, chills, diaphoresis, fatigue, fever and unexpected weight change.   HENT:  Negative for congestion, ear pain, hearing loss, nosebleeds, postnasal drip, rhinorrhea, sinus pressure, sneezing, sore throat, tinnitus, trouble swallowing and voice change.    Eyes:  Negative for photophobia, pain, discharge, redness, itching and visual disturbance.   Respiratory:  Negative for cough, choking, chest tightness, shortness of breath and wheezing.    Cardiovascular:  Negative for chest pain, palpitations and leg swelling.   Gastrointestinal:  Negative for abdominal distention, abdominal pain, blood in stool, constipation, diarrhea, nausea and vomiting.   Endocrine: Negative for cold intolerance, heat intolerance, polydipsia and polyuria.   Genitourinary:  Negative for dysuria, flank pain, frequency, hematuria and urgency.   Musculoskeletal:  Positive for gait problem. Negative for arthralgias, back pain, joint swelling, myalgias, neck pain and neck stiffness.   Skin:  Negative for rash and wound.   Allergic/Immunologic: Negative for immunocompromised state.   Neurological:  Negative for dizziness, tremors, seizures, syncope, facial asymmetry, speech difficulty, weakness,  "light-headedness, numbness and headaches.   Hematological:  Negative for adenopathy. Does not bruise/bleed easily.   Psychiatric/Behavioral:  Negative for agitation, behavioral problems, confusion, dysphoric mood, hallucinations, self-injury, sleep disturbance and suicidal ideas. The patient is not nervous/anxious.        Objective   /60 (BP Location: Left arm, Patient Position: Sitting, BP Cuff Size: Adult)   Pulse 85   Temp 36.3 °C (97.3 °F) (Temporal)   Resp 16   Ht 1.613 m (5' 3.5\")   Wt (!) 35.3 kg (77 lb 12.8 oz)   SpO2 95%   BMI 13.57 kg/m²     Physical Exam  Constitutional:       General: She is not in acute distress.     Appearance: She is not ill-appearing or diaphoretic.   HENT:      Head: Normocephalic and atraumatic.      Right Ear: External ear normal.      Left Ear: External ear normal.      Nose: Nose normal. No rhinorrhea.   Eyes:      General: Lids are normal. No scleral icterus.        Right eye: No discharge.         Left eye: No discharge.      Conjunctiva/sclera: Conjunctivae normal.   Cardiovascular:      Rate and Rhythm: Regular rhythm.      Pulses: Normal pulses.      Heart sounds: No murmur heard.  Pulmonary:      Effort: Pulmonary effort is normal. No respiratory distress.      Breath sounds: No decreased breath sounds, wheezing, rhonchi or rales.   Abdominal:      General: Bowel sounds are normal. There is no distension.      Palpations: Abdomen is soft. There is no mass.      Tenderness: There is no abdominal tenderness. There is no guarding or rebound.   Musculoskeletal:         General: No swelling, tenderness or deformity.      Cervical back: No rigidity or tenderness.      Right lower leg: No edema.      Left lower leg: No edema.   Lymphadenopathy:      Cervical: No cervical adenopathy.      Upper Body:      Right upper body: No supraclavicular adenopathy.      Left upper body: No supraclavicular adenopathy.   Skin:     General: Skin is warm and dry.      Coloration: " Skin is not jaundiced or pale.      Findings: No erythema, lesion or rash.   Neurological:      General: No focal deficit present.      Mental Status: She is alert and oriented to person, place, and time.      Sensory: No sensory deficit.      Motor: No weakness or tremor.      Coordination: Coordination normal.      Gait: Gait normal.      Comments: Slums 4/30   Psychiatric:         Mood and Affect: Mood normal. Affect is not inappropriate.         Behavior: Behavior normal.         Assessment/Plan   Diagnoses and all orders for this visit:  Moderate dementia without behavioral disturbance, psychotic disturbance, mood disturbance, or anxiety, unspecified dementia type (Multi)  -     CT head wo IV contrast; Future  -     Referral to Neurology; Future  -     Referral to Home Care; Future  Abnormal gait  -     CT head wo IV contrast; Future  -     Referral to Home Care; Future  Decreased activities of daily living (ADL)  -     CT head wo IV contrast; Future  -     Referral to Home Care; Future  Advanced directives, counseling/discussion  -     DNR      Advance care planning was discussed including living will, power of  for healthcare and living will.  Advance care planning packet will be provided to patient at discharge.  Patient was encouraged to bring in any advanced care planning paperwork to file on the chart at their own convenience.  (~16min spent discussing above)  Keep next appt

## 2024-06-27 NOTE — HH CARE COORDINATION
Home Care received a Referral for Nursing, Physical Therapy, Occupational Therapy, Home Health Aide, and Medical Social Work. We have processed the referral for a Start of Care on 07/01/2024.     If you have any questions or concerns, please feel free to contact us at [phone]. Follow the prompts, enter your five digit zip code, and you will be directed to your care team on [facility] 1.

## 2024-07-02 ENCOUNTER — HOSPITAL ENCOUNTER (OUTPATIENT)
Dept: RADIOLOGY | Facility: HOSPITAL | Age: 89
Discharge: HOME | End: 2024-07-02
Payer: MEDICARE

## 2024-07-02 DIAGNOSIS — Z78.9 DECREASED ACTIVITIES OF DAILY LIVING (ADL): ICD-10-CM

## 2024-07-02 DIAGNOSIS — R26.9 ABNORMAL GAIT: ICD-10-CM

## 2024-07-02 DIAGNOSIS — F03.B0 MODERATE DEMENTIA WITHOUT BEHAVIORAL DISTURBANCE, PSYCHOTIC DISTURBANCE, MOOD DISTURBANCE, OR ANXIETY, UNSPECIFIED DEMENTIA TYPE (MULTI): ICD-10-CM

## 2024-07-02 PROCEDURE — 70450 CT HEAD/BRAIN W/O DYE: CPT

## 2024-07-02 PROCEDURE — 70450 CT HEAD/BRAIN W/O DYE: CPT | Performed by: RADIOLOGY

## 2024-07-04 ENCOUNTER — HOME CARE VISIT (OUTPATIENT)
Dept: HOME HEALTH SERVICES | Facility: HOME HEALTH | Age: 89
End: 2024-07-04
Payer: MEDICARE

## 2024-07-04 VITALS
TEMPERATURE: 98.3 F | OXYGEN SATURATION: 97 % | SYSTOLIC BLOOD PRESSURE: 100 MMHG | HEART RATE: 72 BPM | RESPIRATION RATE: 16 BRPM | DIASTOLIC BLOOD PRESSURE: 60 MMHG

## 2024-07-04 PROCEDURE — 1090000002 HH PPS REVENUE DEBIT

## 2024-07-04 PROCEDURE — G0152 HHCP-SERV OF OT,EA 15 MIN: HCPCS | Mod: HHH

## 2024-07-04 PROCEDURE — 169592 NO-PAY CLAIM PROCEDURE

## 2024-07-04 PROCEDURE — 1090000001 HH PPS REVENUE CREDIT

## 2024-07-04 PROCEDURE — G0299 HHS/HOSPICE OF RN EA 15 MIN: HCPCS | Mod: HHH

## 2024-07-04 PROCEDURE — 0023 HH SOC

## 2024-07-04 ASSESSMENT — ENCOUNTER SYMPTOMS
LOWEST PAIN SEVERITY IN PAST 24 HOURS: 0/10
PERSON REPORTING PAIN: PATIENT
HIGHEST PAIN SEVERITY IN PAST 24 HOURS: 3/10
PAIN LOCATION - PAIN SEVERITY: 2/10
PAIN SEVERITY GOAL: 4/10
PAIN: 1

## 2024-07-04 ASSESSMENT — ACTIVITIES OF DAILY LIVING (ADL): ENTERING_EXITING_HOME: SUPERVISION

## 2024-07-05 PROCEDURE — 1090000001 HH PPS REVENUE CREDIT

## 2024-07-05 PROCEDURE — 1090000002 HH PPS REVENUE DEBIT

## 2024-07-05 ASSESSMENT — ENCOUNTER SYMPTOMS
PERSON REPORTING PAIN: PATIENT
DENIES PAIN: 1

## 2024-07-05 ASSESSMENT — ACTIVITIES OF DAILY LIVING (ADL)
PREPARING MEALS: DEPENDENT
LAUNDRY: DEPENDENT
DRESSING_UB_CURRENT_FUNCTION: SUPERVISION
TOILETING: SUPERVISION
AMBULATION ASSISTANCE: 1
TOILETING: MODERATE ASSIST
GROOMING_CURRENT_FUNCTION: SUPERVISION
LAUNDRY ASSESSED: 1
LIGHT HOUSEKEEPING: DEPENDENT
TOILETING: 1
GROOMING ASSESSED: 1
HOUSEKEEPING ASSESSED: 1
BATHING ASSESSED: 1
BATHING_CURRENT_FUNCTION: MAXIMUM ASSIST
AMBULATION ASSISTANCE: SUPERVISION
DRESSING_LB_CURRENT_FUNCTION: SUPERVISION

## 2024-07-06 PROCEDURE — 1090000001 HH PPS REVENUE CREDIT

## 2024-07-06 PROCEDURE — 1090000002 HH PPS REVENUE DEBIT

## 2024-07-07 PROCEDURE — 1090000001 HH PPS REVENUE CREDIT

## 2024-07-07 PROCEDURE — 1090000002 HH PPS REVENUE DEBIT

## 2024-07-07 SDOH — ECONOMIC STABILITY: HOUSING INSECURITY
HOME SAFETY: LIVES WITH HUSBAND. DAUGHTER PRESENT 2 DAYS FOR 5 HOURS PER DAY TO ASSIST WITH SHOWERING AND AROUYD HOME. LOOKING FOR LTC HCA

## 2024-07-07 ASSESSMENT — ACTIVITIES OF DAILY LIVING (ADL): OASIS_M1830: 05

## 2024-07-07 ASSESSMENT — ENCOUNTER SYMPTOMS
DESCRIPTION OF MEMORY LOSS: SHORT TERM
DESCRIPTION OF MEMORY LOSS: LONG TERM
MUSCLE WEAKNESS: 1

## 2024-07-08 ENCOUNTER — HOME CARE VISIT (OUTPATIENT)
Dept: HOME HEALTH SERVICES | Facility: HOME HEALTH | Age: 89
End: 2024-07-08
Payer: MEDICARE

## 2024-07-08 VITALS — HEART RATE: 83 BPM | TEMPERATURE: 98 F | OXYGEN SATURATION: 98 %

## 2024-07-08 PROCEDURE — 1090000002 HH PPS REVENUE DEBIT

## 2024-07-08 PROCEDURE — G0156 HHCP-SVS OF AIDE,EA 15 MIN: HCPCS | Mod: HHH

## 2024-07-08 PROCEDURE — 1090000001 HH PPS REVENUE CREDIT

## 2024-07-08 PROCEDURE — G0151 HHCP-SERV OF PT,EA 15 MIN: HCPCS | Mod: HHH

## 2024-07-08 SDOH — HEALTH STABILITY: PHYSICAL HEALTH: PHYSICAL EXERCISE: STANDING

## 2024-07-08 SDOH — HEALTH STABILITY: PHYSICAL HEALTH: EXERCISE TYPE: INSTRUCTED AND DEMONSTRATED CGA STANDING THER EX PROGRAM AT KITCHEN SINK

## 2024-07-08 SDOH — HEALTH STABILITY: PHYSICAL HEALTH: EXERCISE ACTIVITY: HEEL RAISES

## 2024-07-08 SDOH — HEALTH STABILITY: PHYSICAL HEALTH: EXERCISE ACTIVITY: KNEE FLEXION

## 2024-07-08 SDOH — HEALTH STABILITY: PHYSICAL HEALTH: EXERCISE ACTIVITY: HIP ABDUCTION

## 2024-07-08 SDOH — HEALTH STABILITY: PHYSICAL HEALTH: EXERCISE ACTIVITY: MINI SQUATS

## 2024-07-08 SDOH — HEALTH STABILITY: PHYSICAL HEALTH: EXERCISE ACTIVITY: HIP EXTENSION

## 2024-07-08 SDOH — HEALTH STABILITY: PHYSICAL HEALTH: EXERCISE ACTIVITY: HIP FLEXION

## 2024-07-08 ASSESSMENT — ENCOUNTER SYMPTOMS
DENIES PAIN: 1
OCCASIONAL FEELINGS OF UNSTEADINESS: 1
PERSON REPORTING PAIN: PATIENT

## 2024-07-08 ASSESSMENT — ACTIVITIES OF DAILY LIVING (ADL): AMBULATION ASSISTANCE ON FLAT SURFACES: 1

## 2024-07-09 ENCOUNTER — HOME CARE VISIT (OUTPATIENT)
Dept: HOME HEALTH SERVICES | Facility: HOME HEALTH | Age: 89
End: 2024-07-09
Payer: MEDICARE

## 2024-07-09 PROCEDURE — G0155 HHCP-SVS OF CSW,EA 15 MIN: HCPCS | Mod: HHH

## 2024-07-09 PROCEDURE — 1090000001 HH PPS REVENUE CREDIT

## 2024-07-09 PROCEDURE — 1090000002 HH PPS REVENUE DEBIT

## 2024-07-09 SDOH — HEALTH STABILITY: MENTAL HEALTH: DRINKING ASSESSMENT COMMENTS: DENIES

## 2024-07-09 SDOH — SOCIAL STABILITY: SOCIAL NETWORK: HELP FROM FAMILY/FRIENDS: "MY HUSBAND MY 2 DAUGHTERS"

## 2024-07-09 ASSESSMENT — ACTIVITIES OF DAILY LIVING (ADL)
LAUNDRY_REQUIRES_ASSISTANCE: 1
SHOPPING_REQUIRES_ASSISTANCE: 1
TOILETING_REQUIRES_ASSISTANCE: 1
BATHING_REQUIRES_ASSISTANCE: 1

## 2024-07-10 ENCOUNTER — HOME CARE VISIT (OUTPATIENT)
Dept: HOME HEALTH SERVICES | Facility: HOME HEALTH | Age: 89
End: 2024-07-10
Payer: MEDICARE

## 2024-07-10 VITALS
SYSTOLIC BLOOD PRESSURE: 90 MMHG | HEART RATE: 70 BPM | OXYGEN SATURATION: 97 % | TEMPERATURE: 99.4 F | DIASTOLIC BLOOD PRESSURE: 60 MMHG

## 2024-07-10 PROCEDURE — 1090000002 HH PPS REVENUE DEBIT

## 2024-07-10 PROCEDURE — G0157 HHC PT ASSISTANT EA 15: HCPCS | Mod: CQ,HHH

## 2024-07-10 PROCEDURE — 1090000001 HH PPS REVENUE CREDIT

## 2024-07-10 SDOH — HEALTH STABILITY: PHYSICAL HEALTH
EXERCISE COMMENTS: STRENGTH TRAINING WITH STANDING EXERCISES USING TWO HANDED SUPPORT HEEL RAISE, MARCHES, HIP ABDUCTION, HIP EXTENSION AND INSTRUCTIONS/DEMONSTRATION FOR ALIGNMENT AND TECHNIQUE, BREATHING OUT WITH EXERTION 15X.

## 2024-07-11 ENCOUNTER — HOME CARE VISIT (OUTPATIENT)
Dept: HOME HEALTH SERVICES | Facility: HOME HEALTH | Age: 89
End: 2024-07-11
Payer: MEDICARE

## 2024-07-11 PROCEDURE — 1090000002 HH PPS REVENUE DEBIT

## 2024-07-11 PROCEDURE — 1090000001 HH PPS REVENUE CREDIT

## 2024-07-11 PROCEDURE — G0156 HHCP-SVS OF AIDE,EA 15 MIN: HCPCS | Mod: HHH

## 2024-07-12 ENCOUNTER — HOME CARE VISIT (OUTPATIENT)
Dept: HOME HEALTH SERVICES | Facility: HOME HEALTH | Age: 89
End: 2024-07-12
Payer: MEDICARE

## 2024-07-12 PROCEDURE — 1090000002 HH PPS REVENUE DEBIT

## 2024-07-12 PROCEDURE — 1090000001 HH PPS REVENUE CREDIT

## 2024-07-12 PROCEDURE — G0299 HHS/HOSPICE OF RN EA 15 MIN: HCPCS | Mod: HHH

## 2024-07-13 VITALS
DIASTOLIC BLOOD PRESSURE: 62 MMHG | TEMPERATURE: 98 F | SYSTOLIC BLOOD PRESSURE: 100 MMHG | RESPIRATION RATE: 16 BRPM | HEART RATE: 61 BPM

## 2024-07-13 PROCEDURE — 1090000001 HH PPS REVENUE CREDIT

## 2024-07-13 PROCEDURE — G0180 MD CERTIFICATION HHA PATIENT: HCPCS | Performed by: FAMILY MEDICINE

## 2024-07-13 PROCEDURE — 1090000002 HH PPS REVENUE DEBIT

## 2024-07-13 ASSESSMENT — ENCOUNTER SYMPTOMS
DENIES PAIN: 1
LAST BOWEL MOVEMENT: 67032
MUSCLE WEAKNESS: 1
APPETITE LEVEL: POOR

## 2024-07-16 ENCOUNTER — HOME CARE VISIT (OUTPATIENT)
Dept: HOME HEALTH SERVICES | Facility: HOME HEALTH | Age: 89
End: 2024-07-16
Payer: MEDICARE

## 2024-07-16 VITALS
OXYGEN SATURATION: 94 % | TEMPERATURE: 98.2 F | DIASTOLIC BLOOD PRESSURE: 43 MMHG | SYSTOLIC BLOOD PRESSURE: 92 MMHG | HEART RATE: 80 BPM

## 2024-07-16 PROCEDURE — G0157 HHC PT ASSISTANT EA 15: HCPCS | Mod: CQ,HHH

## 2024-07-16 PROCEDURE — G0156 HHCP-SVS OF AIDE,EA 15 MIN: HCPCS | Mod: HHH

## 2024-07-16 SDOH — HEALTH STABILITY: PHYSICAL HEALTH
EXERCISE COMMENTS: STRENGTH TRAINING IN STANDING WITH REINFORCEMENT FOR ALIGNMENT AND PACING, BREATHING OUT WITH EXERTION WITH FOCUS ON USE OF ONE HANDED SUPPORT TODAY FOR 15 REPS. HEEL RAISE, MARCHES, HIP ABDUCTION, ALTERNATING KNEE FLEXION, MINI-SQUATS AND HIP EXTENS

## 2024-07-16 SDOH — HEALTH STABILITY: PHYSICAL HEALTH: EXERCISE COMMENTS: ION.

## 2024-07-16 ASSESSMENT — ENCOUNTER SYMPTOMS
DENIES PAIN: 1
PERSON REPORTING PAIN: PATIENT
HIGHEST PAIN SEVERITY IN PAST 24 HOURS: 0/10

## 2024-07-18 ENCOUNTER — HOME CARE VISIT (OUTPATIENT)
Dept: HOME HEALTH SERVICES | Facility: HOME HEALTH | Age: 89
End: 2024-07-18
Payer: MEDICARE

## 2024-07-18 VITALS
SYSTOLIC BLOOD PRESSURE: 110 MMHG | RESPIRATION RATE: 16 BRPM | TEMPERATURE: 98 F | DIASTOLIC BLOOD PRESSURE: 60 MMHG | HEART RATE: 79 BPM

## 2024-07-18 PROCEDURE — G0299 HHS/HOSPICE OF RN EA 15 MIN: HCPCS | Mod: HHH

## 2024-07-18 ASSESSMENT — ENCOUNTER SYMPTOMS
DENIES PAIN: 1
APPETITE LEVEL: FAIR
MUSCLE WEAKNESS: 1

## 2024-07-19 ENCOUNTER — HOME CARE VISIT (OUTPATIENT)
Dept: HOME HEALTH SERVICES | Facility: HOME HEALTH | Age: 89
End: 2024-07-19
Payer: MEDICARE

## 2024-07-19 PROCEDURE — G0156 HHCP-SVS OF AIDE,EA 15 MIN: HCPCS | Mod: HHH

## 2024-07-22 ENCOUNTER — HOME CARE VISIT (OUTPATIENT)
Dept: HOME HEALTH SERVICES | Facility: HOME HEALTH | Age: 89
End: 2024-07-22
Payer: MEDICARE

## 2024-07-22 PROCEDURE — G0156 HHCP-SVS OF AIDE,EA 15 MIN: HCPCS | Mod: HHH

## 2024-07-24 ENCOUNTER — HOME CARE VISIT (OUTPATIENT)
Dept: HOME HEALTH SERVICES | Facility: HOME HEALTH | Age: 89
End: 2024-07-24
Payer: MEDICARE

## 2024-07-24 VITALS
TEMPERATURE: 98.6 F | HEART RATE: 70 BPM | OXYGEN SATURATION: 97 % | DIASTOLIC BLOOD PRESSURE: 40 MMHG | SYSTOLIC BLOOD PRESSURE: 92 MMHG

## 2024-07-24 PROCEDURE — G0157 HHC PT ASSISTANT EA 15: HCPCS | Mod: CQ,HHH

## 2024-07-24 SDOH — HEALTH STABILITY: PHYSICAL HEALTH
EXERCISE COMMENTS: COMPLETED STANDING HEEL RAISE, MARCHES, HIP ABDUCTION, HIP EXTENSION, ALTERNATING KNEE FLEXION, MINI-SQUATS AND REINFORCEMENT FOR FORWARD VISION, REDUCED RELIANCE ON UPPER BODY SUPPORT 15X.

## 2024-07-25 ENCOUNTER — HOME CARE VISIT (OUTPATIENT)
Dept: HOME HEALTH SERVICES | Facility: HOME HEALTH | Age: 89
End: 2024-07-25
Payer: MEDICARE

## 2024-07-25 VITALS
OXYGEN SATURATION: 95 % | HEART RATE: 86 BPM | RESPIRATION RATE: 16 BRPM | TEMPERATURE: 97.1 F | DIASTOLIC BLOOD PRESSURE: 64 MMHG | SYSTOLIC BLOOD PRESSURE: 102 MMHG

## 2024-07-25 PROCEDURE — G0156 HHCP-SVS OF AIDE,EA 15 MIN: HCPCS | Mod: HHH

## 2024-07-25 PROCEDURE — G0299 HHS/HOSPICE OF RN EA 15 MIN: HCPCS | Mod: HHH

## 2024-07-25 ASSESSMENT — ENCOUNTER SYMPTOMS
CHANGE IN APPETITE: UNCHANGED
DENIES PAIN: 1
MUSCLE WEAKNESS: 1

## 2024-07-27 ASSESSMENT — ENCOUNTER SYMPTOMS: FREQUENCY: 1

## 2024-07-31 ENCOUNTER — HOME CARE VISIT (OUTPATIENT)
Dept: HOME HEALTH SERVICES | Facility: HOME HEALTH | Age: 89
End: 2024-07-31
Payer: MEDICARE

## 2024-07-31 VITALS — DIASTOLIC BLOOD PRESSURE: 54 MMHG | SYSTOLIC BLOOD PRESSURE: 90 MMHG

## 2024-07-31 PROCEDURE — G0157 HHC PT ASSISTANT EA 15: HCPCS | Mod: CQ,HHH

## 2024-07-31 ASSESSMENT — ENCOUNTER SYMPTOMS
DENIES PAIN: 1
PERSON REPORTING PAIN: PATIENT

## 2024-08-05 DIAGNOSIS — N30.00 ACUTE CYSTITIS WITHOUT HEMATURIA: Primary | ICD-10-CM

## 2024-08-05 RX ORDER — AMOXICILLIN AND CLAVULANATE POTASSIUM 400; 57 MG/5ML; MG/5ML
875 POWDER, FOR SUSPENSION ORAL 2 TIMES DAILY
Qty: 218 ML | Refills: 0 | Status: SHIPPED | OUTPATIENT
Start: 2024-08-05 | End: 2024-08-15

## 2024-08-06 ENCOUNTER — HOME CARE VISIT (OUTPATIENT)
Dept: HOME HEALTH SERVICES | Facility: HOME HEALTH | Age: 89
End: 2024-08-06
Payer: MEDICARE

## 2024-08-06 VITALS — OXYGEN SATURATION: 98 % | TEMPERATURE: 97.7 F | HEART RATE: 99 BPM

## 2024-08-06 PROCEDURE — G0151 HHCP-SERV OF PT,EA 15 MIN: HCPCS | Mod: HHH

## 2024-08-06 ASSESSMENT — ENCOUNTER SYMPTOMS
OCCASIONAL FEELINGS OF UNSTEADINESS: 0
DENIES PAIN: 1
PERSON REPORTING PAIN: PATIENT

## 2024-08-06 ASSESSMENT — ACTIVITIES OF DAILY LIVING (ADL)
AMBULATION ASSISTANCE ON FLAT SURFACES: 1
OASIS_M1830: 02
HOME_HEALTH_OASIS: 00

## 2024-08-08 ENCOUNTER — APPOINTMENT (OUTPATIENT)
Dept: PRIMARY CARE | Facility: CLINIC | Age: 89
End: 2024-08-08
Payer: MEDICARE

## 2024-08-08 VITALS
TEMPERATURE: 97.7 F | BODY MASS INDEX: 12.64 KG/M2 | SYSTOLIC BLOOD PRESSURE: 100 MMHG | HEIGHT: 64 IN | RESPIRATION RATE: 18 BRPM | WEIGHT: 74 LBS | HEART RATE: 99 BPM | OXYGEN SATURATION: 95 % | DIASTOLIC BLOOD PRESSURE: 62 MMHG

## 2024-08-08 DIAGNOSIS — N39.0 URINARY TRACT INFECTION WITHOUT HEMATURIA, SITE UNSPECIFIED: Primary | ICD-10-CM

## 2024-08-08 DIAGNOSIS — R63.6 UNDERWEIGHT: ICD-10-CM

## 2024-08-08 DIAGNOSIS — L89.311 PRESSURE INJURY OF RIGHT BUTTOCK, STAGE 1: ICD-10-CM

## 2024-08-08 DIAGNOSIS — R19.5 LOOSE STOOLS: ICD-10-CM

## 2024-08-08 PROCEDURE — 1036F TOBACCO NON-USER: CPT | Performed by: PHYSICIAN ASSISTANT

## 2024-08-08 PROCEDURE — 1123F ACP DISCUSS/DSCN MKR DOCD: CPT | Performed by: PHYSICIAN ASSISTANT

## 2024-08-08 PROCEDURE — 1159F MED LIST DOCD IN RCRD: CPT | Performed by: PHYSICIAN ASSISTANT

## 2024-08-08 PROCEDURE — 1157F ADVNC CARE PLAN IN RCRD: CPT | Performed by: PHYSICIAN ASSISTANT

## 2024-08-08 PROCEDURE — 99214 OFFICE O/P EST MOD 30 MIN: CPT | Performed by: PHYSICIAN ASSISTANT

## 2024-08-08 PROCEDURE — 1160F RVW MEDS BY RX/DR IN RCRD: CPT | Performed by: PHYSICIAN ASSISTANT

## 2024-08-08 ASSESSMENT — ENCOUNTER SYMPTOMS
DIARRHEA: 1
DYSURIA: 1
ABDOMINAL PAIN: 1

## 2024-08-08 NOTE — PROGRESS NOTES
"Subjective   Patient ID: Esther Ceja is a 89 y.o. female who presents for UTI symptoms (Dr Delgado pt here today for UTI symptoms which include; pt started the antibiotic on Tuesday but can't leave a sample. Pt states the burning is getting better being on the medication. ), Abdominal Pain (Lower abd pain past month now off/on), and Dysphagia (Has been going on for about 3 weeks with food.).    HPI     Dysphagia/ underweight:   - Family helping with diet, trying to implement more calories with Ensure  - Dysphagia mild and not wanting to pursue workup     UTI:   - on abx now and sxs improving but getting diarrhea with it     Review of Systems   Gastrointestinal:  Positive for abdominal pain and diarrhea.   Genitourinary:  Positive for dysuria.       Objective   /62   Pulse 99   Temp 36.5 °C (97.7 °F)   Resp 18   Ht 1.613 m (5' 3.5\")   Wt (!) 33.6 kg (74 lb)   SpO2 95%   BMI 12.90 kg/m²     Physical Exam  Constitutional:       Appearance: She is cachectic.   Abdominal:      General: Abdomen is flat.      Palpations: There is no mass.      Tenderness: There is abdominal tenderness (mild suprapubic). There is no guarding or rebound.      Hernia: No hernia is present.   Skin:     Comments: Small right gluteal superficial skin wound          Assessment/Plan     Problem List Items Addressed This Visit    None  Visit Diagnoses       Urinary tract infection without hematuria, site unspecified    -  Primary    Pressure injury of right buttock, stage 1        Underweight        Loose stools                - Started Augmentin for the UTI and those sxs improving but developing diarrhea. Due to her low body weight, will taper dose down to one tab daily. May need to go with alternative if diarrhea worsens. Clinical concern for C.diff to be considered if worsening. Asked her daughter (and caretaker) to monitor closely and let me know if improving with lower dose.     - For the pressure ulcer, recommended position changes " and donut cushion to alleviate pressure. Continue with the pink salve and keeping it clean and monitoring     - For the weight, continue with the meal replacement drinks and soft foods as tolerated. Offered workup for dysphagia but pt and family declined due to age and risks.     - Discussed options for more long term care if needed. Can reach out to [facility] to help facilitate - they will let us know if wanting this service.

## 2024-08-19 ENCOUNTER — TELEPHONE (OUTPATIENT)
Dept: PRIMARY CARE | Facility: CLINIC | Age: 89
End: 2024-08-19
Payer: MEDICARE

## 2024-08-19 DIAGNOSIS — F03.90 DEMENTIA, UNSPECIFIED DEMENTIA SEVERITY, UNSPECIFIED DEMENTIA TYPE, UNSPECIFIED WHETHER BEHAVIORAL, PSYCHOTIC, OR MOOD DISTURBANCE OR ANXIETY (MULTI): ICD-10-CM

## 2024-08-19 DIAGNOSIS — R53.81 DEBILITY: ICD-10-CM

## 2024-08-19 NOTE — TELEPHONE ENCOUNTER
Jacy @ [facility] contacted the office informing they have a meeting with family 08- regarding hospice service.

## 2024-10-31 ENCOUNTER — APPOINTMENT (OUTPATIENT)
Dept: NEUROLOGY | Facility: CLINIC | Age: 89
End: 2024-10-31
Payer: MEDICARE

## 2024-11-13 ENCOUNTER — APPOINTMENT (OUTPATIENT)
Dept: NEUROLOGY | Facility: CLINIC | Age: 89
End: 2024-11-13
Payer: MEDICARE

## 2024-11-18 ENCOUNTER — APPOINTMENT (OUTPATIENT)
Dept: PRIMARY CARE | Facility: CLINIC | Age: 89
End: 2024-11-18
Payer: MEDICARE

## 2025-01-01 ENCOUNTER — OFFICE VISIT (OUTPATIENT)
Dept: GERIATRIC MEDICINE | Age: 89
End: 2025-01-01

## 2025-01-01 DIAGNOSIS — F03.A0 MILD DEMENTIA, UNSPECIFIED DEMENTIA TYPE, UNSPECIFIED WHETHER BEHAVIORAL, PSYCHOTIC, OR MOOD DISTURBANCE OR ANXIETY (HCC): Primary | ICD-10-CM

## 2025-01-01 DIAGNOSIS — R53.1 WEAKNESS: Primary | ICD-10-CM

## 2025-01-01 DIAGNOSIS — F02.B0 MODERATE ALZHEIMER'S DEMENTIA OF OTHER ONSET, UNSPECIFIED WHETHER BEHAVIORAL, PSYCHOTIC, OR MOOD DISTURBANCE OR ANXIETY (HCC): ICD-10-CM

## 2025-01-01 DIAGNOSIS — R62.7 ADULT FAILURE TO THRIVE: Primary | ICD-10-CM

## 2025-01-01 DIAGNOSIS — F02.B3 MODERATE DEMENTIA ASSOCIATED WITH OTHER UNDERLYING DISEASE, WITH MOOD DISTURBANCE (HCC): ICD-10-CM

## 2025-01-01 DIAGNOSIS — R53.1 WEAKNESS: ICD-10-CM

## 2025-01-01 DIAGNOSIS — G30.8 MODERATE ALZHEIMER'S DEMENTIA OF OTHER ONSET, UNSPECIFIED WHETHER BEHAVIORAL, PSYCHOTIC, OR MOOD DISTURBANCE OR ANXIETY (HCC): ICD-10-CM

## 2025-01-01 DIAGNOSIS — R62.7 ADULT FAILURE TO THRIVE: ICD-10-CM

## 2025-01-01 DIAGNOSIS — M15.9 OSTEOARTHRITIS OF MULTIPLE JOINTS, UNSPECIFIED OSTEOARTHRITIS TYPE: ICD-10-CM

## 2025-01-08 RX ORDER — ACETAMINOPHEN 325 MG/1
650 TABLET ORAL EVERY 6 HOURS PRN
COMMUNITY

## 2025-01-08 RX ORDER — SENNOSIDES A AND B 8.6 MG/1
2 TABLET, FILM COATED ORAL 2 TIMES DAILY PRN
COMMUNITY

## 2025-01-08 RX ORDER — QUETIAPINE FUMARATE 25 MG/1
25 TABLET, FILM COATED ORAL 2 TIMES DAILY
COMMUNITY

## 2025-02-06 NOTE — PROGRESS NOTES
Patient Name: Debbie Us  Date: 2/5/2025  YOB: 1935  Medical Record Number: [id number]              History of Present Illness:      Review of Systems    Review of Systems: All 14 review of systems negative other than as stated above    Social History     Tobacco Use    Smoking status: Never    Smokeless tobacco: Never   Substance Use Topics    Drug use: Never         Past Medical History:   Diagnosis Date    Iron deficiency anemia            Past Surgical History:   Procedure Laterality Date    COLONOSCOPY      ESOPHAGOGASTRODUODENOSCOPY      EYE SURGERY  1999    holes in the macula    ELVIRA AND BSO (CERVIX REMOVED)      TOTAL HIP ARTHROPLASTY Bilateral 1985 jan and october         No current outpatient medications on file prior to visit.     No current facility-administered medications on file prior to visit.       Allergies   Allergen Reactions    Aspirin Hives    Levaquin [Levofloxacin]     Penicillins          No family history on file.      Physical Exam:      Physical Exam    There were no vitals taken for this visit.      .   Lab Results   Component Value Date    WBC 6.9 09/06/2022    HGB 13.7 09/06/2022    HCT 41.5 09/06/2022    MCV 88.0 09/06/2022     09/06/2022     Lab Results   Component Value Date/Time     09/06/2022 11:22 AM    K 4.1 09/06/2022 11:22 AM     09/06/2022 11:22 AM    CO2 23 09/06/2022 11:22 AM    BUN 14 09/06/2022 11:22 AM    CREATININE 0.61 09/06/2022 11:22 AM    GLUCOSE 101 09/06/2022 11:22 AM    CALCIUM 9.5 09/06/2022 11:22 AM    LABGLOM >60.0 09/06/2022 11:22 AM                ASSESSMENT:  There is no problem list on file for this patient.        PLAN:   Diagnosis Orders   1. Mild dementia, unspecified dementia type, unspecified whether behavioral, psychotic, or mood disturbance or anxiety (HCC)        2. Weakness        3. Osteoarthritis of multiple joints, unspecified osteoarthritis type              Please note orders entered on site at facility

## 2025-02-12 NOTE — PROGRESS NOTES
SUBJECTIVE:        ROS:  The rest of the 14 point ROS negative    PHYSICAL EXAM: VSS per facility record      ASSESSMENT & PLAN:   Diagnosis Orders   1. Weakness        2. Moderate Alzheimer's dementia of other onset, unspecified whether behavioral, psychotic, or mood disturbance or anxiety (HCC)        3. Adult failure to thrive                      Past Medical History:   Diagnosis Date    Iron deficiency anemia          Past Surgical History:   Procedure Laterality Date    COLONOSCOPY      ESOPHAGOGASTRODUODENOSCOPY      EYE SURGERY  1999    holes in the macula    ELVIRA AND BSO (CERVIX REMOVED)      TOTAL HIP ARTHROPLASTY Bilateral 1985 jan and october         Current Outpatient Medications on File Prior to Visit   Medication Sig Dispense Refill    acetaminophen (TYLENOL) 325 MG tablet Take 2 tablets by mouth every 6 hours as needed for Pain      senna (SENOKOT) 8.6 MG tablet Take 2 tablets by mouth 2 times daily as needed for Constipation      QUEtiapine (SEROQUEL) 25 MG tablet Take 1 tablet by mouth 2 times daily Indications: Agitation And 25mg Q6H PRN for anxiety       No current facility-administered medications on file prior to visit.         No family history on file.    Social History     Socioeconomic History    Marital status:      Spouse name: Not on file    Number of children: Not on file    Years of education: Not on file    Highest education level: Not on file   Occupational History    Not on file   Tobacco Use    Smoking status: Never    Smokeless tobacco: Never   Substance and Sexual Activity    Alcohol use: Not on file    Drug use: Never    Sexual activity: Not on file   Other Topics Concern    Not on file   Social History Narrative    Not on file     Social Determinants of Health     Financial Resource Strain: Low Risk  (9/6/2022)    Overall Financial Resource Strain (CARDIA)     Difficulty of Paying Living Expenses: Not hard at all   Food Insecurity: No Food Insecurity (9/6/2022)

## 2025-02-15 NOTE — PROGRESS NOTES
SUBJECTIVE:        ROS:  The rest of the 14 point ROS negative    PHYSICAL EXAM: VSS per facility record      ASSESSMENT & PLAN:   Diagnosis Orders   1. Adult failure to thrive        2. Moderate dementia associated with other underlying disease, with mood disturbance (HCC)                      Past Medical History:   Diagnosis Date    Iron deficiency anemia          Past Surgical History:   Procedure Laterality Date    COLONOSCOPY      ESOPHAGOGASTRODUODENOSCOPY      EYE SURGERY  1999    holes in the macula    ELVIRA AND BSO (CERVIX REMOVED)      TOTAL HIP ARTHROPLASTY Bilateral 1985 jan and october         Current Outpatient Medications on File Prior to Visit   Medication Sig Dispense Refill    acetaminophen (TYLENOL) 325 MG tablet Take 2 tablets by mouth every 6 hours as needed for Pain      senna (SENOKOT) 8.6 MG tablet Take 2 tablets by mouth 2 times daily as needed for Constipation      QUEtiapine (SEROQUEL) 25 MG tablet Take 1 tablet by mouth 2 times daily Indications: Agitation And 25mg Q6H PRN for anxiety       No current facility-administered medications on file prior to visit.         No family history on file.    Social History     Socioeconomic History    Marital status:      Spouse name: Not on file    Number of children: Not on file    Years of education: Not on file    Highest education level: Not on file   Occupational History    Not on file   Tobacco Use    Smoking status: Never    Smokeless tobacco: Never   Substance and Sexual Activity    Alcohol use: Not on file    Drug use: Never    Sexual activity: Not on file   Other Topics Concern    Not on file   Social History Narrative    Not on file     Social Determinants of Health     Financial Resource Strain: Low Risk  (9/6/2022)    Overall Financial Resource Strain (CARDIA)     Difficulty of Paying Living Expenses: Not hard at all   Food Insecurity: No Food Insecurity (9/6/2022)    Hunger Vital Sign     Worried About Running Out of Food in